# Patient Record
Sex: FEMALE | Race: BLACK OR AFRICAN AMERICAN | NOT HISPANIC OR LATINO | Employment: OTHER | URBAN - METROPOLITAN AREA
[De-identification: names, ages, dates, MRNs, and addresses within clinical notes are randomized per-mention and may not be internally consistent; named-entity substitution may affect disease eponyms.]

---

## 2023-02-01 PROBLEM — I10 HYPERTENSION: Status: ACTIVE | Noted: 2023-02-01

## 2023-02-01 PROBLEM — E11.9 DIABETES MELLITUS (MULTI): Status: ACTIVE | Noted: 2023-02-01

## 2023-02-01 PROBLEM — E78.5 DYSLIPIDEMIA: Status: ACTIVE | Noted: 2023-02-01

## 2023-02-01 PROBLEM — F17.200 CURRENT SMOKER: Status: ACTIVE | Noted: 2023-02-01

## 2023-02-01 PROBLEM — D64.9 ANEMIA: Status: ACTIVE | Noted: 2023-02-01

## 2023-02-01 PROBLEM — E87.6 HYPOKALEMIA: Status: ACTIVE | Noted: 2023-02-01

## 2023-02-01 PROBLEM — E66.811 CLASS 1 OBESITY WITH SERIOUS COMORBIDITY AND BODY MASS INDEX (BMI) OF 31.0 TO 31.9 IN ADULT: Status: ACTIVE | Noted: 2023-02-01

## 2023-02-01 PROBLEM — E66.9 CLASS 1 OBESITY WITH SERIOUS COMORBIDITY AND BODY MASS INDEX (BMI) OF 31.0 TO 31.9 IN ADULT: Status: ACTIVE | Noted: 2023-02-01

## 2023-02-01 PROBLEM — E83.52 HYPERCALCEMIA: Status: ACTIVE | Noted: 2023-02-01

## 2023-02-01 PROBLEM — N18.31 CKD STAGE G3A/A2, GFR 45-59 AND ALBUMIN CREATININE RATIO 30-299 MG/G (MULTI): Status: ACTIVE | Noted: 2023-02-01

## 2023-02-01 RX ORDER — DAPAGLIFLOZIN 10 MG/1
1 TABLET, FILM COATED ORAL DAILY
COMMUNITY
End: 2023-07-14 | Stop reason: SDUPTHER

## 2023-02-01 RX ORDER — INSULIN HUMAN 100 [IU]/ML
INJECTION, SOLUTION PARENTERAL
COMMUNITY
End: 2024-03-06 | Stop reason: SDUPTHER

## 2023-02-01 RX ORDER — BLOOD-GLUCOSE METER
KIT MISCELLANEOUS
COMMUNITY
Start: 2018-06-04 | End: 2023-07-17

## 2023-02-01 RX ORDER — PRAVASTATIN SODIUM 80 MG/1
1 TABLET ORAL DAILY
COMMUNITY
Start: 2013-06-12 | End: 2023-04-14

## 2023-02-01 RX ORDER — NAPROXEN SODIUM 220 MG
TABLET ORAL
COMMUNITY
Start: 2016-05-03

## 2023-02-01 RX ORDER — CHOLECALCIFEROL (VITAMIN D3) 25 MCG
1 TABLET,CHEWABLE ORAL
COMMUNITY

## 2023-02-01 RX ORDER — FUROSEMIDE 80 MG/1
TABLET ORAL
COMMUNITY
End: 2023-07-17 | Stop reason: ALTCHOICE

## 2023-02-01 RX ORDER — AMLODIPINE BESYLATE 10 MG/1
TABLET ORAL
COMMUNITY
Start: 2016-12-12

## 2023-02-01 RX ORDER — INSULIN HUMAN 100 [IU]/ML
INJECTION, SUSPENSION SUBCUTANEOUS
COMMUNITY
Start: 2013-09-21 | End: 2023-04-19

## 2023-02-01 RX ORDER — VIT C/E/ZN/COPPR/LUTEIN/ZEAXAN 250MG-90MG
1 CAPSULE ORAL DAILY
COMMUNITY

## 2023-02-01 RX ORDER — CARVEDILOL 12.5 MG/1
1 TABLET ORAL 2 TIMES DAILY
COMMUNITY
Start: 2018-06-29

## 2023-02-01 RX ORDER — DOXAZOSIN 8 MG/1
TABLET ORAL
COMMUNITY

## 2023-02-01 RX ORDER — ASPIRIN 81 MG/1
1 TABLET ORAL DAILY
COMMUNITY

## 2023-02-01 RX ORDER — VALSARTAN 320 MG/1
1 TABLET ORAL DAILY
COMMUNITY

## 2023-03-15 ENCOUNTER — OFFICE VISIT (OUTPATIENT)
Dept: PRIMARY CARE | Facility: CLINIC | Age: 78
End: 2023-03-15
Payer: MEDICARE

## 2023-03-15 VITALS
DIASTOLIC BLOOD PRESSURE: 56 MMHG | SYSTOLIC BLOOD PRESSURE: 111 MMHG | TEMPERATURE: 97.1 F | WEIGHT: 168 LBS | HEART RATE: 57 BPM | HEIGHT: 63 IN | BODY MASS INDEX: 29.77 KG/M2 | OXYGEN SATURATION: 96 %

## 2023-03-15 DIAGNOSIS — N18.31 STAGE 3A CHRONIC KIDNEY DISEASE (MULTI): ICD-10-CM

## 2023-03-15 DIAGNOSIS — E11.29 TYPE 2 DIABETES MELLITUS WITH MICROALBUMINURIA, WITH LONG-TERM CURRENT USE OF INSULIN (MULTI): ICD-10-CM

## 2023-03-15 DIAGNOSIS — R80.9 TYPE 2 DIABETES MELLITUS WITH MICROALBUMINURIA, WITH LONG-TERM CURRENT USE OF INSULIN (MULTI): ICD-10-CM

## 2023-03-15 DIAGNOSIS — I15.0 RENOVASCULAR HYPERTENSION: Primary | ICD-10-CM

## 2023-03-15 DIAGNOSIS — F17.200 CURRENT SMOKER: ICD-10-CM

## 2023-03-15 DIAGNOSIS — Z79.4 TYPE 2 DIABETES MELLITUS WITH MICROALBUMINURIA, WITH LONG-TERM CURRENT USE OF INSULIN (MULTI): ICD-10-CM

## 2023-03-15 PROCEDURE — 3074F SYST BP LT 130 MM HG: CPT | Performed by: INTERNAL MEDICINE

## 2023-03-15 PROCEDURE — 3078F DIAST BP <80 MM HG: CPT | Performed by: INTERNAL MEDICINE

## 2023-03-15 PROCEDURE — 4010F ACE/ARB THERAPY RXD/TAKEN: CPT | Performed by: INTERNAL MEDICINE

## 2023-03-15 PROCEDURE — 99406 BEHAV CHNG SMOKING 3-10 MIN: CPT | Performed by: INTERNAL MEDICINE

## 2023-03-15 PROCEDURE — 99214 OFFICE O/P EST MOD 30 MIN: CPT | Performed by: INTERNAL MEDICINE

## 2023-03-15 ASSESSMENT — ENCOUNTER SYMPTOMS
OCCASIONAL FEELINGS OF UNSTEADINESS: 0
LOSS OF SENSATION IN FEET: 0
DEPRESSION: 0

## 2023-03-15 ASSESSMENT — PATIENT HEALTH QUESTIONNAIRE - PHQ9
1. LITTLE INTEREST OR PLEASURE IN DOING THINGS: NOT AT ALL
SUM OF ALL RESPONSES TO PHQ9 QUESTIONS 1 AND 2: 0
2. FEELING DOWN, DEPRESSED OR HOPELESS: NOT AT ALL

## 2023-03-15 ASSESSMENT — COLUMBIA-SUICIDE SEVERITY RATING SCALE - C-SSRS
2. HAVE YOU ACTUALLY HAD ANY THOUGHTS OF KILLING YOURSELF?: NO
6. HAVE YOU EVER DONE ANYTHING, STARTED TO DO ANYTHING, OR PREPARED TO DO ANYTHING TO END YOUR LIFE?: NO

## 2023-03-15 NOTE — PROGRESS NOTES
Subjective   Maureen Ahuja is a 78 y.o. female who presents for interval follow-up.    HPI  Follows with her nephrologist, Dr. Jane for CKD (baseline Cr 1.2-1.4). Continues on valsartan 320 mg, amlodipine 10 mg, carvedilol 12.5 mg twice daily, and doxazosin at bedtime. Started on furosemide summer of 2021. 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 She checks her blood pressures once weekly and they are generally 130s-140s/60s-80s.  Started on Farxiga at some point after last visit 6/10/2022. Reports insurance not approving so currently relying on samples.     She reports compliance with her medications, no physical complaints. Brother had a stroke and she has a lot of responsibility as his caretaker.      Only on insulin since VERONIQUE and development of CKD until addition of Farxiga 10 mg. Used to follow with Dr. Hodge for diabetes before he retired. Measures her sugar 3 times per day.   Blood sugar log reviewed:  100-150 in the mornings, 130-140 around lunch/dinner, 100-170 at nighttime. Hi/Lo = 168/89 .   Previous visit we decreased evening insulin 12/22-10/18 hypoglycemia since.     Currently taking Humulin N and R; 36 in AM and 18 in PM units and 5 and 10 units respectively. Again stressed the importance of avoiding hypoglycemia, and it does not sound like she has had any hypoglycemic episodes recently. At the same time needs to be more careful with diet. She checks her blood sugars 3 times daily. She denies any vision changes, numbness tingling or pain in the feet, no chest pain shortness of breath or dizziness. Follows with ophtho annually.      She doesn't walk as much as she used to, mostly care for her brother who is wheelchair bound in the house alone. Now that her brother has an electric wheelchair she has been trying to walk daily. She has been eating better, more vegetables. She loves salads, cooks herself. Had quit smoking as of July 8, 2016 except for  "occasional brief lapses, but back to 10 cigs per week because of recent stress of her brother's stroke, since Dec 2020. She is trying to cut back. She knows that she wants to quit but is not ready at this time.      Nephrology-Dr. Jane  Endocrinology-Dr. Hodge (retired) - now will be this office  Ophthalmology-Dr. Tony, Dr. Goldberg     Objective   /56   Pulse 57   Temp 36.2 °C (97.1 °F)   Ht 1.6 m (5' 3\")   Wt 76.2 kg (168 lb)   SpO2 96%   BMI 29.76 kg/m²    Physical Exam  Gen: NAD, pleasant, A&Ox3  HEENT: PERRL, EOMI, MMM, OP clear  Neck: supple, no JVD, normal carotid upstroke  Pulm: lungs rhonchorous, good air movement, no wheezes or crackles  CV: RRR, no m/r/g, 2+ DP pulses  Abd: NABS, soft, NT, ND no HSM  Ext: trace peripheral edema to ankles, feet and lower extremities without wounds  Neuro: CN II-XII intact, no focal motor deficits; decreased sensation to vibration in b/l feet      Assessment/Plan   HTN/CKD: nephrologist, Dr. 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 Hodge (endo) retired - now will be managed here per her preference  -regular podiatry, Dr. Inman  -Annual ophthalmology appointments  -Continue aspirin for primary prevention, pt prefers to continue after discussion of risk/benefit  -Continue pravastatin for primary prevention and dyslipidemia  -Continue diet " regulation  -Off metformin since VERONIQUE, started SGLT2-i in JUN 2022, currently relying on samples     Anemia: Stable borderline macrocytic, likely secondary to B12 deficiency  -Continue to B12 supplementation     Health maintenance  -Colonoscopy March 2010 normal, next due in 2020 for screening purposes ([facility]) -Cologuard + 12/26/2022, referred for colonoscopy  -Mammogram 6/27/2022, continue biannual  -Recommend annual influenza, Shingrix recommended, had covid vaccine J&J in 3/2021  -Continue diet and exercise lifestyle changes  -Patient has recent relapse, smoking 10cigs/day- contemplative stage  -Last DEXA in 12/22/2017 with normal bone density repeat in 5 years, ordered     Followup in 3-4 months or as needed  Problem List Items Addressed This Visit    None           Tae Mancia MD

## 2023-03-30 PROBLEM — E66.811 CLASS 1 OBESITY WITH SERIOUS COMORBIDITY AND BODY MASS INDEX (BMI) OF 31.0 TO 31.9 IN ADULT: Status: RESOLVED | Noted: 2023-02-01 | Resolved: 2023-03-30

## 2023-03-30 PROBLEM — E66.9 CLASS 1 OBESITY WITH SERIOUS COMORBIDITY AND BODY MASS INDEX (BMI) OF 31.0 TO 31.9 IN ADULT: Status: RESOLVED | Noted: 2023-02-01 | Resolved: 2023-03-30

## 2023-04-12 DIAGNOSIS — E78.5 HYPERLIPIDEMIA, UNSPECIFIED: ICD-10-CM

## 2023-04-14 RX ORDER — PRAVASTATIN SODIUM 80 MG/1
TABLET ORAL
Qty: 90 TABLET | Refills: 3 | Status: SHIPPED | OUTPATIENT
Start: 2023-04-14 | End: 2024-04-04

## 2023-04-18 DIAGNOSIS — E11.9 TYPE 2 DIABETES MELLITUS WITHOUT COMPLICATIONS (MULTI): ICD-10-CM

## 2023-04-19 ENCOUNTER — HOSPITAL ENCOUNTER (OUTPATIENT)
Dept: DATA CONVERSION | Facility: HOSPITAL | Age: 78
End: 2023-04-19
Attending: STUDENT IN AN ORGANIZED HEALTH CARE EDUCATION/TRAINING PROGRAM | Admitting: STUDENT IN AN ORGANIZED HEALTH CARE EDUCATION/TRAINING PROGRAM
Payer: MEDICARE

## 2023-04-19 DIAGNOSIS — Z12.11 ENCOUNTER FOR SCREENING FOR MALIGNANT NEOPLASM OF COLON: ICD-10-CM

## 2023-04-19 DIAGNOSIS — K64.8 OTHER HEMORRHOIDS: ICD-10-CM

## 2023-04-19 DIAGNOSIS — Z12.12 ENCOUNTER FOR SCREENING FOR MALIGNANT NEOPLASM OF RECTUM: ICD-10-CM

## 2023-04-19 DIAGNOSIS — Z98.0 INTESTINAL BYPASS AND ANASTOMOSIS STATUS: ICD-10-CM

## 2023-04-19 RX ORDER — INSULIN HUMAN 100 [IU]/ML
INJECTION, SUSPENSION SUBCUTANEOUS
Qty: 60 ML | Refills: 1 | Status: SHIPPED | OUTPATIENT
Start: 2023-04-19 | End: 2024-03-06 | Stop reason: SDUPTHER

## 2023-07-14 DIAGNOSIS — E11.29 TYPE 2 DIABETES MELLITUS WITH MICROALBUMINURIA, WITH LONG-TERM CURRENT USE OF INSULIN (MULTI): Primary | ICD-10-CM

## 2023-07-14 DIAGNOSIS — R80.9 TYPE 2 DIABETES MELLITUS WITH MICROALBUMINURIA, WITH LONG-TERM CURRENT USE OF INSULIN (MULTI): Primary | ICD-10-CM

## 2023-07-14 DIAGNOSIS — Z79.4 TYPE 2 DIABETES MELLITUS WITH MICROALBUMINURIA, WITH LONG-TERM CURRENT USE OF INSULIN (MULTI): Primary | ICD-10-CM

## 2023-07-14 RX ORDER — DAPAGLIFLOZIN 10 MG/1
10 TABLET, FILM COATED ORAL DAILY
Qty: 90 TABLET | Refills: 3 | Status: SHIPPED | OUTPATIENT
Start: 2023-07-14 | End: 2023-07-14

## 2023-07-15 DIAGNOSIS — E11.9 TYPE 2 DIABETES MELLITUS WITHOUT COMPLICATIONS (MULTI): ICD-10-CM

## 2023-07-17 ENCOUNTER — OFFICE VISIT (OUTPATIENT)
Dept: PRIMARY CARE | Facility: CLINIC | Age: 78
End: 2023-07-17
Payer: MEDICARE

## 2023-07-17 VITALS
TEMPERATURE: 97.5 F | HEART RATE: 56 BPM | BODY MASS INDEX: 31.53 KG/M2 | SYSTOLIC BLOOD PRESSURE: 146 MMHG | DIASTOLIC BLOOD PRESSURE: 73 MMHG | WEIGHT: 178 LBS | OXYGEN SATURATION: 93 %

## 2023-07-17 DIAGNOSIS — N18.32 STAGE 3B CHRONIC KIDNEY DISEASE (MULTI): ICD-10-CM

## 2023-07-17 DIAGNOSIS — Z79.4 TYPE 2 DIABETES MELLITUS WITH MICROALBUMINURIA, WITH LONG-TERM CURRENT USE OF INSULIN (MULTI): ICD-10-CM

## 2023-07-17 DIAGNOSIS — R80.9 TYPE 2 DIABETES MELLITUS WITH MICROALBUMINURIA, WITH LONG-TERM CURRENT USE OF INSULIN (MULTI): ICD-10-CM

## 2023-07-17 DIAGNOSIS — I15.0 RENOVASCULAR HYPERTENSION: Primary | ICD-10-CM

## 2023-07-17 DIAGNOSIS — E11.29 TYPE 2 DIABETES MELLITUS WITH MICROALBUMINURIA, WITH LONG-TERM CURRENT USE OF INSULIN (MULTI): ICD-10-CM

## 2023-07-17 LAB — HBA1C MFR BLD: 7.2 % (ref 4.2–6.5)

## 2023-07-17 PROCEDURE — 1036F TOBACCO NON-USER: CPT | Performed by: INTERNAL MEDICINE

## 2023-07-17 PROCEDURE — 99214 OFFICE O/P EST MOD 30 MIN: CPT | Performed by: INTERNAL MEDICINE

## 2023-07-17 PROCEDURE — 1159F MED LIST DOCD IN RCRD: CPT | Performed by: INTERNAL MEDICINE

## 2023-07-17 PROCEDURE — 83036 HEMOGLOBIN GLYCOSYLATED A1C: CPT | Performed by: INTERNAL MEDICINE

## 2023-07-17 PROCEDURE — 1160F RVW MEDS BY RX/DR IN RCRD: CPT | Performed by: INTERNAL MEDICINE

## 2023-07-17 PROCEDURE — 3078F DIAST BP <80 MM HG: CPT | Performed by: INTERNAL MEDICINE

## 2023-07-17 PROCEDURE — 3077F SYST BP >= 140 MM HG: CPT | Performed by: INTERNAL MEDICINE

## 2023-07-17 RX ORDER — FUROSEMIDE 20 MG/1
20 TABLET ORAL 2 TIMES DAILY
Qty: 60 TABLET | Refills: 5 | COMMUNITY
Start: 2023-06-26 | End: 2023-12-23

## 2023-07-17 RX ORDER — BLOOD-GLUCOSE METER
KIT MISCELLANEOUS
Qty: 300 STRIP | Refills: 3 | Status: SHIPPED | OUTPATIENT
Start: 2023-07-17

## 2023-07-17 NOTE — PROGRESS NOTES
Subjective   Maureen Ahuja is a 78 y.o. female who presents for interval follow-up.    HPI  Follows with her nephrologist, Dr. Jane for CKD (baseline Cr 1.2-1.4). Continues on valsartan 320 mg, amlodipine 10 mg, carvedilol 12.5 mg twice daily, and doxazosin at bedtime. Started on furosemide summer of 2021. 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 She checks her blood pressures once weekly and they are generally 130s-140s/60s-80s.       She reports compliance with her medications, no physical complaints. Brother had a stroke and she has a lot of responsibility as his caretaker.      Only on insulin since VERONIQUE and development of CKD until addition of Farxiga 10 mg. Used to follow with Dr. Hodge for diabetes before he retired. Measures her sugar 3 times per day.   Blood sugar log reviewed:  100-150 in the mornings, 130-140 around lunch/dinner, 100-170 at nighttime. Hi/Lo = 168/89 .      Currently taking Humulin N and R; 36 in AM and 18 in PM units and 4 and 12 units respectively. Again stressed the importance of avoiding hypoglycemia, and it does not sound like she has had any hypoglycemic episodes recently. At the same time needs to be more careful with diet. She checks her blood sugars 3 times daily. She denies any vision changes, numbness tingling or pain in the feet, no chest pain shortness of breath or dizziness. Follows with ophtho annually.      She doesn't walk as much as she used to, mostly care for her brother who is wheelchair bound in the house alone. Now that her brother has an electric wheelchair she has been trying to walk daily. She has been eating better, more vegetables. She loves salads, cooks herself. Had quit smoking as of July 8, 2016 except for occasional brief lapses, but back to 10 cigs per week because of recent stress of her brother's stroke, since Dec 2020. She is trying to cut back. She knows that she wants to quit but is not ready at this  time.      Nephrology-Dr. Jane  Endocrinology-Dr. Hodge (retired) - now will be this office  Ophthalmology-Dr. Tony, Dr. Goldberg     Objective   /73   Pulse 56   Temp 36.4 °C (97.5 °F)   Wt 80.7 kg (178 lb)   SpO2 93%   BMI 31.53 kg/m²    Physical Exam  Gen: NAD, pleasant, A&Ox3  HEENT: PERRL, EOMI, MMM, OP clear  Neck: supple, no JVD, normal carotid upstroke  Pulm: lungs rhonchorous, good air movement, no wheezes or crackles  CV: RRR, no m/r/g, 2+ DP pulses  Abd: NABS, soft, NT, ND no HSM  Ext: trace peripheral edema to ankles, feet and lower extremities without wounds  Neuro: CN II-XII intact, no focal motor deficits; decreased sensation to vibration in b/l feet      Assessment/Plan   HTN/CKD G3b w/proteinuria: nephrologist, Dr. 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 Hodge (endo) retired - now will be managed here per her preference  -regular podiatry, Dr. Inman  -Annual ophthalmology appointments  -Continue aspirin for primary prevention, pt prefers to continue after discussion of risk/benefit  -Continue pravastatin for primary prevention and dyslipidemia  -Continue diet regulation  -Off metformin since VERONIQUE, started SGLT2-i in JUN 2022, currently relying on samples     Anemia: Stable borderline macrocytic, likely secondary to B12 deficiency  -Continue to B12 supplementation     Health maintenance  -Colonoscopy March 2010 normal, next due in 2020 for  screening purposes (J[facility]) -Cologuard + 12/26/2022, referred for colonoscopy  -Mammogram 6/27/2022, continue biannual  -Recommend annual influenza, Shingrix recommended, had covid vaccine J&J in 3/2021  -Continue diet and exercise lifestyle changes  -Patient has recent relapse, smoking 10cigs/day- contemplative stage  -Last DEXA in 12/22/2017 with normal bone density repeat in 5 years, ordered     Followup in 3-4 months or as needed  Problem List Items Addressed This Visit       Diabetes mellitus (CMS/HCC)    Relevant Orders    POCT Glycosylated Hemoglobin (HGB A1C) docked device              Tae Mancia MD

## 2023-08-17 PROBLEM — E11.29 DIABETES MELLITUS WITH MICROALBUMINURIA (MULTI): Status: ACTIVE | Noted: 2023-08-17

## 2023-08-17 PROBLEM — Z79.4 CONTROLLED TYPE 2 DIABETES MELLITUS WITH STAGE 3 CHRONIC KIDNEY DISEASE, WITH LONG-TERM CURRENT USE OF INSULIN (MULTI): Status: ACTIVE | Noted: 2023-08-17

## 2023-08-17 PROBLEM — E11.22 CONTROLLED TYPE 2 DIABETES MELLITUS WITH STAGE 3 CHRONIC KIDNEY DISEASE, WITH LONG-TERM CURRENT USE OF INSULIN (MULTI): Status: ACTIVE | Noted: 2023-08-17

## 2023-08-17 PROBLEM — R80.9 DIABETES MELLITUS WITH MICROALBUMINURIA (MULTI): Status: ACTIVE | Noted: 2023-08-17

## 2023-08-17 PROBLEM — N18.30 CONTROLLED TYPE 2 DIABETES MELLITUS WITH STAGE 3 CHRONIC KIDNEY DISEASE, WITH LONG-TERM CURRENT USE OF INSULIN (MULTI): Status: ACTIVE | Noted: 2023-08-17

## 2023-08-17 RX ORDER — DOXAZOSIN 2 MG/1
2 TABLET ORAL DAILY
COMMUNITY
Start: 2023-08-04

## 2023-10-02 ENCOUNTER — PHARMACY VISIT (OUTPATIENT)
Dept: PHARMACY | Facility: CLINIC | Age: 78
End: 2023-10-02
Payer: MEDICARE

## 2023-10-02 PROCEDURE — RXMED WILLOW AMBULATORY MEDICATION CHARGE

## 2023-10-17 ENCOUNTER — OFFICE VISIT (OUTPATIENT)
Dept: PRIMARY CARE | Facility: CLINIC | Age: 78
End: 2023-10-17
Payer: MEDICARE

## 2023-10-17 VITALS
SYSTOLIC BLOOD PRESSURE: 137 MMHG | BODY MASS INDEX: 31.18 KG/M2 | TEMPERATURE: 97.4 F | DIASTOLIC BLOOD PRESSURE: 60 MMHG | HEART RATE: 55 BPM | WEIGHT: 176 LBS | OXYGEN SATURATION: 95 %

## 2023-10-17 DIAGNOSIS — R80.9 TYPE 2 DIABETES MELLITUS WITH MICROALBUMINURIA, WITH LONG-TERM CURRENT USE OF INSULIN (MULTI): ICD-10-CM

## 2023-10-17 DIAGNOSIS — E11.29 TYPE 2 DIABETES MELLITUS WITH MICROALBUMINURIA, WITH LONG-TERM CURRENT USE OF INSULIN (MULTI): ICD-10-CM

## 2023-10-17 DIAGNOSIS — I15.0 RENOVASCULAR HYPERTENSION: ICD-10-CM

## 2023-10-17 DIAGNOSIS — N18.32 CKD STAGE G3B/A2, GFR 30-44 AND ALBUMIN CREATININE RATIO 30-299 MG/G (MULTI): Primary | ICD-10-CM

## 2023-10-17 DIAGNOSIS — Z79.4 TYPE 2 DIABETES MELLITUS WITH MICROALBUMINURIA, WITH LONG-TERM CURRENT USE OF INSULIN (MULTI): ICD-10-CM

## 2023-10-17 LAB — HBA1C MFR BLD: 7.2 % (ref 4.2–6.5)

## 2023-10-17 PROCEDURE — 99214 OFFICE O/P EST MOD 30 MIN: CPT | Performed by: INTERNAL MEDICINE

## 2023-10-17 PROCEDURE — 90662 IIV NO PRSV INCREASED AG IM: CPT | Performed by: INTERNAL MEDICINE

## 2023-10-17 PROCEDURE — G0008 ADMIN INFLUENZA VIRUS VAC: HCPCS | Performed by: INTERNAL MEDICINE

## 2023-10-17 PROCEDURE — 1160F RVW MEDS BY RX/DR IN RCRD: CPT | Performed by: INTERNAL MEDICINE

## 2023-10-17 PROCEDURE — 1036F TOBACCO NON-USER: CPT | Performed by: INTERNAL MEDICINE

## 2023-10-17 PROCEDURE — 3078F DIAST BP <80 MM HG: CPT | Performed by: INTERNAL MEDICINE

## 2023-10-17 PROCEDURE — 1159F MED LIST DOCD IN RCRD: CPT | Performed by: INTERNAL MEDICINE

## 2023-10-17 PROCEDURE — 83036 HEMOGLOBIN GLYCOSYLATED A1C: CPT | Mod: CLIA WAIVED TEST | Performed by: INTERNAL MEDICINE

## 2023-10-17 PROCEDURE — 3075F SYST BP GE 130 - 139MM HG: CPT | Performed by: INTERNAL MEDICINE

## 2023-10-17 NOTE — PROGRESS NOTES
Subjective   Maureen Ahuja is a 78 y.o. female who presents for interval follow-up.    HPI  Follows with her nephrologist, Dr. Jane for CKD (baseline Cr 1.2-1.4). Continues on valsartan 320 mg, amlodipine 10 mg, carvedilol 12.5 mg twice daily, and doxazosin at bedtime. Started on furosemide summer of 2021. 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 She checks her blood pressures once weekly and they are generally 130s-140s/60s-80s.       She reports compliance with her medications, no physical complaints. Brother had a stroke and she has a lot of responsibility as his caretaker.      Only on insulin since VERONIQUE and development of CKD until addition of Farxiga 10 mg. Used to follow with Dr. Hodge for diabetes before he retired. Measures her sugar 3 times per day.   Blood sugar log reviewed:  100-150 in the mornings, 130-140 around lunch/dinner, 100-170 at nighttime. Hi/Lo = 168/89 .      Currently taking Humulin N and R; 36 in AM and 18 in PM units and 4 and 12 units respectively. Again stressed the importance of avoiding hypoglycemia, and it does not sound like she has had any hypoglycemic episodes recently. Lowest was 93 on once occasion.   At the same time needs to be more careful with diet. She checks her blood sugars 3 times daily. She denies any vision changes, numbness tingling or pain in the feet, no chest pain shortness of breath or dizziness. Follows with ophtho annually.      She doesn't walk as much as she used to, mostly care for her brother who is wheelchair bound in the house alone. Now that her brother has an electric wheelchair she has been trying to walk daily. She has been eating better, more vegetables. She loves salads, cooks herself. Had quit smoking as of July 8, 2016 except for occasional brief lapses, but back to 10 cigs per week because of recent stress of her brother's stroke, since Dec 2020. She is trying to cut back. She  knows that she wants to quit but is not ready at this time.      Nephrology-Dr. Jane  Endocrinology-Dr. Hodge (retired) - now will be this office  Ophthalmology-Dr. Tony, Dr. Goldberg     Objective   /60   Pulse 55   Temp 36.3 °C (97.4 °F)   Wt 79.8 kg (176 lb)   SpO2 95%   BMI 31.18 kg/m²    Physical Exam  Gen: NAD, pleasant, A&Ox3  HEENT: PERRL, EOMI, MMM, OP clear  Neck: supple, no JVD, normal carotid upstroke  Pulm: lungs rhonchorous, good air movement, no wheezes or crackles  CV: RRR, no m/r/g, 2+ DP pulses  Abd: NABS, soft, NT, ND no HSM  Ext: trace peripheral edema to ankles, feet and lower extremities without wounds  Neuro: CN II-XII intact, no focal motor deficits; decreased sensation to vibration in b/l feet      Assessment/Plan   HTN/CKD G3b w/proteinuria: nephrologist, Dr. 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 Hodge (endo) retired - now will be managed here per her preference  -regular podiatry, Dr. Inman  -Annual ophthalmology appointments  -Continue aspirin for primary prevention, pt prefers to continue after discussion of risk/benefit  -Continue pravastatin for primary prevention and dyslipidemia  -Continue diet regulation  -Off metformin since VERONIQUE, started SGLT2-i in JUN 2022, currently relying on samples     Anemia: Stable borderline  macrocytic, likely secondary to B12 deficiency  -Continue to B12 supplementation     Health maintenance  -Colonoscopy March 2010 normal, next due in 2020 for screening purposes ([facility]) -Cologuard + 12/26/2022, referred for colonoscopy, had flex sig 4/19/2023  -Mammogram 6/27/2022, continue biannual  -Recommend annual influenza, Shingrix recommended, had covid vaccine J&J in 3/2021  -Continue diet and exercise lifestyle changes  -Patient has recent relapse, smoking 10cigs/day- contemplative stage  -Last DEXA in 12/22/2017 with normal bone density repeat in 5 years, ordered     Followup in 3-4 months or as needed  Problem List Items Addressed This Visit       Diabetes mellitus (CMS/HCC)    Relevant Orders    POCT Glycosylated Hemoglobin (HGB A1C) docked device              Tae Mancia MD

## 2023-10-20 ENCOUNTER — APPOINTMENT (OUTPATIENT)
Dept: RADIOLOGY | Facility: CLINIC | Age: 78
End: 2023-10-20
Payer: MEDICARE

## 2023-12-26 PROCEDURE — RXMED WILLOW AMBULATORY MEDICATION CHARGE

## 2023-12-28 ENCOUNTER — PHARMACY VISIT (OUTPATIENT)
Dept: PHARMACY | Facility: CLINIC | Age: 78
End: 2023-12-28
Payer: MEDICARE

## 2024-03-06 ENCOUNTER — OFFICE VISIT (OUTPATIENT)
Dept: PRIMARY CARE | Facility: CLINIC | Age: 79
End: 2024-03-06
Payer: MEDICARE

## 2024-03-06 VITALS
HEART RATE: 76 BPM | HEIGHT: 63 IN | OXYGEN SATURATION: 95 % | SYSTOLIC BLOOD PRESSURE: 155 MMHG | WEIGHT: 170 LBS | BODY MASS INDEX: 30.12 KG/M2 | TEMPERATURE: 98.3 F | DIASTOLIC BLOOD PRESSURE: 58 MMHG

## 2024-03-06 DIAGNOSIS — N18.32 CKD STAGE G3B/A2, GFR 30-44 AND ALBUMIN CREATININE RATIO 30-299 MG/G (MULTI): ICD-10-CM

## 2024-03-06 DIAGNOSIS — J43.2 CENTRILOBULAR EMPHYSEMA (MULTI): ICD-10-CM

## 2024-03-06 DIAGNOSIS — Z87.891 PERSONAL HISTORY OF NICOTINE DEPENDENCE: ICD-10-CM

## 2024-03-06 DIAGNOSIS — F17.200 ENCOUNTER FOR SCREENING FOR MALIGNANT NEOPLASM OF LUNG IN CURRENT SMOKER WITH 30 PACK YEAR HISTORY OR GREATER: ICD-10-CM

## 2024-03-06 DIAGNOSIS — N18.30 CONTROLLED TYPE 2 DIABETES MELLITUS WITH STAGE 3 CHRONIC KIDNEY DISEASE, WITH LONG-TERM CURRENT USE OF INSULIN (MULTI): ICD-10-CM

## 2024-03-06 DIAGNOSIS — Z12.31 ENCOUNTER FOR SCREENING MAMMOGRAM FOR BREAST CANCER: ICD-10-CM

## 2024-03-06 DIAGNOSIS — E11.9 TYPE 2 DIABETES MELLITUS WITHOUT COMPLICATIONS (MULTI): ICD-10-CM

## 2024-03-06 DIAGNOSIS — R80.9 TYPE 2 DIABETES MELLITUS WITH MICROALBUMINURIA, WITH LONG-TERM CURRENT USE OF INSULIN (MULTI): ICD-10-CM

## 2024-03-06 DIAGNOSIS — R91.8 PULMONARY NODULES: ICD-10-CM

## 2024-03-06 DIAGNOSIS — Z12.2 ENCOUNTER FOR SCREENING FOR MALIGNANT NEOPLASM OF LUNG IN CURRENT SMOKER WITH 30 PACK YEAR HISTORY OR GREATER: ICD-10-CM

## 2024-03-06 DIAGNOSIS — F17.200 CURRENT SMOKER: ICD-10-CM

## 2024-03-06 DIAGNOSIS — I15.0 RENOVASCULAR HYPERTENSION: ICD-10-CM

## 2024-03-06 DIAGNOSIS — E11.22 CONTROLLED TYPE 2 DIABETES MELLITUS WITH STAGE 3 CHRONIC KIDNEY DISEASE, WITH LONG-TERM CURRENT USE OF INSULIN (MULTI): ICD-10-CM

## 2024-03-06 DIAGNOSIS — Z79.4 TYPE 2 DIABETES MELLITUS WITH MICROALBUMINURIA, WITH LONG-TERM CURRENT USE OF INSULIN (MULTI): ICD-10-CM

## 2024-03-06 DIAGNOSIS — E11.29 TYPE 2 DIABETES MELLITUS WITH MICROALBUMINURIA, WITH LONG-TERM CURRENT USE OF INSULIN (MULTI): ICD-10-CM

## 2024-03-06 DIAGNOSIS — Z79.4 CONTROLLED TYPE 2 DIABETES MELLITUS WITH STAGE 3 CHRONIC KIDNEY DISEASE, WITH LONG-TERM CURRENT USE OF INSULIN (MULTI): ICD-10-CM

## 2024-03-06 DIAGNOSIS — Z00.00 ROUTINE GENERAL MEDICAL EXAMINATION AT HEALTH CARE FACILITY: Primary | ICD-10-CM

## 2024-03-06 LAB
ALBUMIN SERPL BCP-MCNC: 3.7 G/DL (ref 3.4–5)
ALP SERPL-CCNC: 75 U/L (ref 33–136)
ALT SERPL W P-5'-P-CCNC: 11 U/L (ref 7–45)
ANION GAP SERPL CALC-SCNC: 15 MMOL/L (ref 10–20)
AST SERPL W P-5'-P-CCNC: 14 U/L (ref 9–39)
BILIRUB SERPL-MCNC: 0.4 MG/DL (ref 0–1.2)
BUN SERPL-MCNC: 17 MG/DL (ref 6–23)
CALCIUM SERPL-MCNC: 9.8 MG/DL (ref 8.6–10.6)
CHLORIDE SERPL-SCNC: 102 MMOL/L (ref 98–107)
CHOLEST SERPL-MCNC: 143 MG/DL (ref 0–199)
CHOLESTEROL/HDL RATIO: 3.9
CO2 SERPL-SCNC: 30 MMOL/L (ref 21–32)
CREAT SERPL-MCNC: 1.39 MG/DL (ref 0.5–1.05)
EGFRCR SERPLBLD CKD-EPI 2021: 39 ML/MIN/1.73M*2
ERYTHROCYTE [DISTWIDTH] IN BLOOD BY AUTOMATED COUNT: 16.3 % (ref 11.5–14.5)
EST. AVERAGE GLUCOSE BLD GHB EST-MCNC: 163 MG/DL
GLUCOSE SERPL-MCNC: 101 MG/DL (ref 74–99)
HBA1C MFR BLD: 7.3 %
HCT VFR BLD AUTO: 43.2 % (ref 36–46)
HDLC SERPL-MCNC: 36.6 MG/DL
HGB BLD-MCNC: 12.5 G/DL (ref 12–16)
IRON SATN MFR SERPL: 13 % (ref 25–45)
IRON SERPL-MCNC: 40 UG/DL (ref 35–150)
LDLC SERPL CALC-MCNC: 83 MG/DL
MCH RBC QN AUTO: 26.7 PG (ref 26–34)
MCHC RBC AUTO-ENTMCNC: 28.9 G/DL (ref 32–36)
MCV RBC AUTO: 92 FL (ref 80–100)
NON HDL CHOLESTEROL: 106 MG/DL (ref 0–149)
NRBC BLD-RTO: 0 /100 WBCS (ref 0–0)
PLATELET # BLD AUTO: 337 X10*3/UL (ref 150–450)
POC ALBUMIN /CREATININE RATIO MANUALLY ENTERED: >300 UG/MG CREAT
POC URINE ALBUMIN: 150 MG/L
POC URINE CREATININE: 200 MG/DL
POTASSIUM SERPL-SCNC: 4 MMOL/L (ref 3.5–5.3)
PROT SERPL-MCNC: 6.7 G/DL (ref 6.4–8.2)
RBC # BLD AUTO: 4.68 X10*6/UL (ref 4–5.2)
SODIUM SERPL-SCNC: 143 MMOL/L (ref 136–145)
TIBC SERPL-MCNC: 311 UG/DL (ref 240–445)
TRIGL SERPL-MCNC: 116 MG/DL (ref 0–149)
UIBC SERPL-MCNC: 271 UG/DL (ref 110–370)
VIT B12 SERPL-MCNC: 273 PG/ML (ref 211–911)
VLDL: 23 MG/DL (ref 0–40)
WBC # BLD AUTO: 8.3 X10*3/UL (ref 4.4–11.3)

## 2024-03-06 PROCEDURE — 99214 OFFICE O/P EST MOD 30 MIN: CPT | Performed by: INTERNAL MEDICINE

## 2024-03-06 PROCEDURE — 36415 COLL VENOUS BLD VENIPUNCTURE: CPT

## 2024-03-06 PROCEDURE — 3077F SYST BP >= 140 MM HG: CPT | Performed by: INTERNAL MEDICINE

## 2024-03-06 PROCEDURE — 1125F AMNT PAIN NOTED PAIN PRSNT: CPT | Performed by: INTERNAL MEDICINE

## 2024-03-06 PROCEDURE — 85027 COMPLETE CBC AUTOMATED: CPT

## 2024-03-06 PROCEDURE — 3078F DIAST BP <80 MM HG: CPT | Performed by: INTERNAL MEDICINE

## 2024-03-06 PROCEDURE — 83550 IRON BINDING TEST: CPT

## 2024-03-06 PROCEDURE — 1159F MED LIST DOCD IN RCRD: CPT | Performed by: INTERNAL MEDICINE

## 2024-03-06 PROCEDURE — 1123F ACP DISCUSS/DSCN MKR DOCD: CPT | Performed by: INTERNAL MEDICINE

## 2024-03-06 PROCEDURE — G0439 PPPS, SUBSEQ VISIT: HCPCS | Performed by: INTERNAL MEDICINE

## 2024-03-06 PROCEDURE — 82607 VITAMIN B-12: CPT

## 2024-03-06 PROCEDURE — 4004F PT TOBACCO SCREEN RCVD TLK: CPT | Performed by: INTERNAL MEDICINE

## 2024-03-06 PROCEDURE — 83540 ASSAY OF IRON: CPT

## 2024-03-06 PROCEDURE — 80053 COMPREHEN METABOLIC PANEL: CPT

## 2024-03-06 PROCEDURE — 1170F FXNL STATUS ASSESSED: CPT | Performed by: INTERNAL MEDICINE

## 2024-03-06 PROCEDURE — 1160F RVW MEDS BY RX/DR IN RCRD: CPT | Performed by: INTERNAL MEDICINE

## 2024-03-06 PROCEDURE — 80061 LIPID PANEL: CPT

## 2024-03-06 PROCEDURE — 83036 HEMOGLOBIN GLYCOSYLATED A1C: CPT

## 2024-03-06 PROCEDURE — 82044 UR ALBUMIN SEMIQUANTITATIVE: CPT | Performed by: INTERNAL MEDICINE

## 2024-03-06 RX ORDER — INSULIN HUMAN 100 [IU]/ML
INJECTION, SUSPENSION SUBCUTANEOUS
Qty: 200 ML | Refills: 1 | Status: SHIPPED | OUTPATIENT
Start: 2024-03-06

## 2024-03-06 RX ORDER — INSULIN HUMAN 100 [IU]/ML
INJECTION, SOLUTION PARENTERAL
Qty: 10 ML | Refills: 3 | Status: SHIPPED | OUTPATIENT
Start: 2024-03-06

## 2024-03-06 ASSESSMENT — ACTIVITIES OF DAILY LIVING (ADL)
DOING_HOUSEWORK: INDEPENDENT
MANAGING_FINANCES: INDEPENDENT
TAKING_MEDICATION: INDEPENDENT
DRESSING: INDEPENDENT
GROCERY_SHOPPING: INDEPENDENT
BATHING: INDEPENDENT

## 2024-03-06 ASSESSMENT — ENCOUNTER SYMPTOMS: OCCASIONAL FEELINGS OF UNSTEADINESS: 0

## 2024-03-06 ASSESSMENT — PAIN SCALES - GENERAL: PAINLEVEL: 2

## 2024-03-06 NOTE — PROGRESS NOTES
Subjective   Maureen Ahuja is a 78 y.o. female who presents for interval follow-up and AWV.    HPI  Follows with her nephrologist, Dr. Jane for CKD (baseline Cr 1.2-1.4). Continues on valsartan 320 mg, amlodipine 10 mg, carvedilol 12.5 mg twice daily, and doxazosin at bedtime. Started on furosemide summer of 2021. 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 She checks her blood pressures once weekly and they are generally 130s-140s/60s-80s.       She reports compliance with her medications, no physical complaints. Brother had a stroke and lives with her, caretakers come M-F.  He wheelchair bound and almost completely dependent.     Only on insulin since development of CKD until addition of Farxiga 10 mg. Used to follow with Dr. Hodge for diabetes before he retired. Measures her sugar 3 times per day.   Blood sugar log reviewed:  100-150 in the mornings, 110-140 around lunch/dinner, 170-220 at nighttime. Hi/Lo = 238/102 .      Currently taking Humulin N 36 units in AM and 18 units in PM and Humulin R 4 units and 12 units.   She purposely eats more at night to avoid nighttime lows.   She denies any vision changes, numbness or pain in the feet, no chest pain shortness of breath or dizziness.     She doesn't walk as much as she used to, mostly care for her brother who is wheelchair bound in the house alone. Now that her brother has an electric wheelchair she has been trying to walk daily. She has been eating better, more vegetables. She loves salads, cooks herself. Had quit smoking as of July 8, 2016 (~1/2ppd for 40 years at that time) except for occasional brief lapses, but back to 10 cigs per week because of recent stress of her brother's stroke, since Dec 2020. She is trying to cut back. She knows that she wants to quit but is not ready at this time.      Nephrology-Dr. Jane  Endocrinology-Dr. Hodge (retired) - now will be this  "office  Ophthalmology-Dr. Tony, Dr. Goldberg     Objective   /58   Pulse 76   Temp 36.8 °C (98.3 °F)   Ht 1.6 m (5' 3\")   Wt 77.1 kg (170 lb)   SpO2 95%   BMI 30.11 kg/m²    Physical Exam  Gen: NAD, pleasant, A&Ox3  HEENT: PERRL, EOMI, MMM, OP clear  Neck: supple, no JVD, normal carotid upstroke  Pulm: lungs rhonchorous, good air movement, no wheezes or crackles  CV: RRR, no m/r/g, 2+ DP pulses  Abd: NABS, soft, NT, ND no HSM  Ext: trace - 1+ peripheral edema to ankles, feet and lower extremities without wounds  Neuro: CN II-XII intact, no focal motor deficits; decreased sensation to vibration in b/l feet      Assessment/Plan   HTN/CKD G3b w/proteinuria: nephrologist, Dr. 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 Hodge (endo) retired - managed here per her preference  -regular podiatry, Dr. Inman  -Annual ophthalmology appointments, Dr. Goldberg  -Continue aspirin for primary prevention, pt prefers to continue after discussion of risk/benefit  -Continue pravastatin for primary prevention and dyslipidemia  -Continue diet regulation  -Off metformin since VERONIQUE, started SGLT2-i in JUN 2022     Anemia: Stable borderline macrocytic, likely secondary to B12 deficiency  -Continue to B12 supplementation     Emphysema: mild, CT 3/3/2023, asymptomatic; this was done [facility], she did not get repeat.    Health " maintenance  -Colonoscopy - flex sig 4/19/2023 told to repeat in 10 years  -Mammogram 6/27/2022, continue biannual  -Recommend annual influenza, Shingrix recommended, had covid vaccine J&J in 3/2021, recommend RSV, COVID booster  -Continue diet and exercise lifestyle changes  -Patient has recent relapse, smoking 10cigs/day- contemplative stage  -Last DEXA in 12/22/2017 with normal bone density repeat in 5 years, ordered, not completed     Followup in 3-4 months or as needed  Problem List Items Addressed This Visit    None           Tae Mancia MD

## 2024-03-21 ENCOUNTER — HOSPITAL ENCOUNTER (OUTPATIENT)
Dept: RADIOLOGY | Facility: CLINIC | Age: 79
Discharge: HOME | End: 2024-03-21
Payer: MEDICARE

## 2024-03-21 DIAGNOSIS — F17.200 ENCOUNTER FOR SCREENING FOR MALIGNANT NEOPLASM OF LUNG IN CURRENT SMOKER WITH 30 PACK YEAR HISTORY OR GREATER: ICD-10-CM

## 2024-03-21 DIAGNOSIS — Z12.2 ENCOUNTER FOR SCREENING FOR MALIGNANT NEOPLASM OF LUNG IN CURRENT SMOKER WITH 30 PACK YEAR HISTORY OR GREATER: ICD-10-CM

## 2024-03-21 DIAGNOSIS — R91.8 PULMONARY NODULES: ICD-10-CM

## 2024-03-21 DIAGNOSIS — Z87.891 PERSONAL HISTORY OF NICOTINE DEPENDENCE: ICD-10-CM

## 2024-03-21 PROCEDURE — 71271 CT THORAX LUNG CANCER SCR C-: CPT

## 2024-03-22 ENCOUNTER — HOSPITAL ENCOUNTER (OUTPATIENT)
Dept: RADIOLOGY | Facility: CLINIC | Age: 79
Discharge: HOME | End: 2024-03-22
Payer: MEDICARE

## 2024-03-22 VITALS — BODY MASS INDEX: 30.12 KG/M2 | HEIGHT: 63 IN | WEIGHT: 169.97 LBS

## 2024-03-22 DIAGNOSIS — Z12.31 ENCOUNTER FOR SCREENING MAMMOGRAM FOR BREAST CANCER: ICD-10-CM

## 2024-03-22 PROCEDURE — 77063 BREAST TOMOSYNTHESIS BI: CPT

## 2024-03-22 PROCEDURE — 77067 SCR MAMMO BI INCL CAD: CPT | Performed by: RADIOLOGY

## 2024-03-22 PROCEDURE — 77063 BREAST TOMOSYNTHESIS BI: CPT | Performed by: RADIOLOGY

## 2024-03-25 PROCEDURE — RXMED WILLOW AMBULATORY MEDICATION CHARGE

## 2024-03-26 ENCOUNTER — PHARMACY VISIT (OUTPATIENT)
Dept: PHARMACY | Facility: CLINIC | Age: 79
End: 2024-03-26
Payer: MEDICARE

## 2024-04-04 DIAGNOSIS — E78.5 HYPERLIPIDEMIA, UNSPECIFIED: ICD-10-CM

## 2024-04-04 RX ORDER — PRAVASTATIN SODIUM 80 MG/1
TABLET ORAL
Qty: 90 TABLET | Refills: 3 | Status: SHIPPED | OUTPATIENT
Start: 2024-04-04

## 2024-06-14 ENCOUNTER — APPOINTMENT (OUTPATIENT)
Dept: PRIMARY CARE | Facility: CLINIC | Age: 79
End: 2024-06-14
Payer: MEDICARE

## 2024-06-14 VITALS
HEART RATE: 70 BPM | DIASTOLIC BLOOD PRESSURE: 51 MMHG | WEIGHT: 166 LBS | SYSTOLIC BLOOD PRESSURE: 133 MMHG | OXYGEN SATURATION: 95 % | TEMPERATURE: 97.3 F | BODY MASS INDEX: 29.41 KG/M2

## 2024-06-14 DIAGNOSIS — I15.0 RENOVASCULAR HYPERTENSION: ICD-10-CM

## 2024-06-14 DIAGNOSIS — E11.9 TYPE 2 DIABETES MELLITUS WITHOUT COMPLICATION, WITHOUT LONG-TERM CURRENT USE OF INSULIN (MULTI): ICD-10-CM

## 2024-06-14 DIAGNOSIS — N18.32 CKD STAGE G3B/A2, GFR 30-44 AND ALBUMIN CREATININE RATIO 30-299 MG/G (MULTI): Primary | ICD-10-CM

## 2024-06-14 LAB — HBA1C MFR BLD: 6.5 % (ref 4.2–6.5)

## 2024-06-14 PROCEDURE — 99214 OFFICE O/P EST MOD 30 MIN: CPT | Performed by: INTERNAL MEDICINE

## 2024-06-14 PROCEDURE — 4004F PT TOBACCO SCREEN RCVD TLK: CPT | Performed by: INTERNAL MEDICINE

## 2024-06-14 PROCEDURE — 83036 HEMOGLOBIN GLYCOSYLATED A1C: CPT | Mod: CLIA WAIVED TEST | Performed by: INTERNAL MEDICINE

## 2024-06-14 PROCEDURE — 1123F ACP DISCUSS/DSCN MKR DOCD: CPT | Performed by: INTERNAL MEDICINE

## 2024-06-14 PROCEDURE — 3075F SYST BP GE 130 - 139MM HG: CPT | Performed by: INTERNAL MEDICINE

## 2024-06-14 PROCEDURE — 3078F DIAST BP <80 MM HG: CPT | Performed by: INTERNAL MEDICINE

## 2024-06-14 NOTE — PROGRESS NOTES
Subjective   Maureen Ahuja is a 79 y.o. female who presents for interval follow-up.    HPI  PMH significant for CKD, IDDM    Interim:  - met with new nephrologist just this week, found out Dr. Jane was no longer there and will be seeing Dr. Mckeon.  Labs from 6/5/2024 reviewed, stable.     She reports compliance with her medications, no physical complaints. Brother had a stroke and lives with her, caretakers come M-F.  He wheelchair bound and almost completely dependent.     Only on insulin since development of CKD until addition of Farxiga 10 mg. Used to follow with Dr. Hodge for diabetes before he retired. Measures her sugar 3 times per day.   Blood sugar log reviewed:  100-150 in the mornings, 110-140 around lunch/dinner, 170-220 at nighttime. Hi/Lo = 226/98.      Currently taking Humulin N 36 units in AM and 18 units in PM and Humulin R 4 units and 12 units.   She purposely eats more at night to avoid nighttime lows.   She denies any vision changes, numbness or pain in the feet, no chest pain shortness of breath or dizziness.  In general has been trying to watch her diet, has lost a few pounds since last visit.     She doesn't walk as much as she used to, mostly care for her brother who is wheelchair bound in the house alone.   She has been eating better, more vegetables. She loves salads, cooks herself. Had quit smoking as of July 8, 2016 (~1/2ppd for 40 years at that time) except for occasional brief lapses, but back to 10 cigs per week because of recent stress of her brother's stroke, since Dec 2020. She is trying to cut back. She knows that she wants to quit but is not ready at this time.      Nephrology-Dr. Jane  Endocrinology-Dr. Hodge (retired) - now will be this office  Ophthalmology-Dr. Tony, Dr. Goldberg     Objective   /51   Pulse 70   Temp 36.3 °C (97.3 °F)   Wt 75.3 kg (166 lb)   SpO2 95%   BMI 29.41 kg/m²    Physical Exam  Gen: NAD, pleasant, A&Ox3  HEENT: PERRL, EOMI, MMM, OP  clear  Neck: supple, no JVD, normal carotid upstroke  Pulm: lungs rhonchorous, good air movement, no wheezes or crackles  CV: RRR, no m/r/g, 2+ DP pulses  Abd: NABS, soft, NT, ND no HSM  Ext: trace - 1+ peripheral edema to ankles, feet and lower extremities without wounds  Neuro: CN II-XII intact, no focal motor deficits; decreased sensation to vibration in b/l feet      Assessment/Plan   HTN/CKD G3b w/proteinuria: nephrologist, Dr. 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 Hodge (endo) retired - managed here per her preference  -regular podiatry, Dr. Inman  -Annual ophthalmology appointments, Dr. Goldberg  -Continue aspirin for primary prevention, pt prefers to continue after discussion of risk/benefit  -Continue pravastatin for primary prevention and dyslipidemia  -Continue diet regulation  -Off metformin since VERONIQUE, started SGLT2-i in JUN 2022     Anemia: Stable borderline macrocytic, likely secondary to B12 deficiency  -Continue to B12 supplementation     Emphysema: mild, CT 3/3/2023, asymptomatic; this was done at [facility], LDCT done 3/21/2024, showing mild emphysema and chronic bronchitis, benign appearing nodules    Health maintenance  -Colonoscopy - flex sig 4/19/2023 told to repeat in 10 years  -Mammogram 6/27/2022, continue biannual  -Recommend annual influenza, Shingrix recommended, had covid vaccine J&J in 3/2021, recommend RSV, COVID booster  -Continue diet and exercise lifestyle  changes  -Patient has recent relapse, smoking 10cigs/day- contemplative stage  -Last DEXA in 12/22/2017 with normal bone density repeat in 5 years, ordered, not completed     Followup in 3-4 months or as needed  Problem List Items Addressed This Visit       Diabetes mellitus (Multi)    Relevant Orders    POCT Glycosylated Hemoglobin (HGB A1C) docked device            Tae Mancia MD

## 2024-06-24 PROCEDURE — RXMED WILLOW AMBULATORY MEDICATION CHARGE

## 2024-07-02 ENCOUNTER — PHARMACY VISIT (OUTPATIENT)
Dept: PHARMACY | Facility: CLINIC | Age: 79
End: 2024-07-02
Payer: MEDICARE

## 2024-09-16 ENCOUNTER — APPOINTMENT (OUTPATIENT)
Dept: PRIMARY CARE | Facility: CLINIC | Age: 79
End: 2024-09-16
Payer: MEDICARE

## 2024-09-16 VITALS
HEIGHT: 63 IN | WEIGHT: 165 LBS | BODY MASS INDEX: 29.23 KG/M2 | DIASTOLIC BLOOD PRESSURE: 57 MMHG | OXYGEN SATURATION: 94 % | SYSTOLIC BLOOD PRESSURE: 127 MMHG | HEART RATE: 63 BPM

## 2024-09-16 DIAGNOSIS — N18.32 CKD STAGE G3B/A2, GFR 30-44 AND ALBUMIN CREATININE RATIO 30-299 MG/G (MULTI): ICD-10-CM

## 2024-09-16 DIAGNOSIS — Z79.4 CONTROLLED TYPE 2 DIABETES MELLITUS WITH STAGE 3 CHRONIC KIDNEY DISEASE, WITH LONG-TERM CURRENT USE OF INSULIN (MULTI): ICD-10-CM

## 2024-09-16 DIAGNOSIS — E11.22 CONTROLLED TYPE 2 DIABETES MELLITUS WITH STAGE 3 CHRONIC KIDNEY DISEASE, WITH LONG-TERM CURRENT USE OF INSULIN (MULTI): ICD-10-CM

## 2024-09-16 DIAGNOSIS — N18.30 CONTROLLED TYPE 2 DIABETES MELLITUS WITH STAGE 3 CHRONIC KIDNEY DISEASE, WITH LONG-TERM CURRENT USE OF INSULIN (MULTI): ICD-10-CM

## 2024-09-16 DIAGNOSIS — F17.200 CURRENT SMOKER: ICD-10-CM

## 2024-09-16 DIAGNOSIS — I10 PRIMARY HYPERTENSION: Primary | ICD-10-CM

## 2024-09-16 LAB — HBA1C MFR BLD: 6.6 % (ref 4.2–6.5)

## 2024-09-16 PROCEDURE — 99214 OFFICE O/P EST MOD 30 MIN: CPT | Performed by: INTERNAL MEDICINE

## 2024-09-16 PROCEDURE — 1159F MED LIST DOCD IN RCRD: CPT | Performed by: INTERNAL MEDICINE

## 2024-09-16 PROCEDURE — G0008 ADMIN INFLUENZA VIRUS VAC: HCPCS | Performed by: INTERNAL MEDICINE

## 2024-09-16 PROCEDURE — 1123F ACP DISCUSS/DSCN MKR DOCD: CPT | Performed by: INTERNAL MEDICINE

## 2024-09-16 PROCEDURE — 90662 IIV NO PRSV INCREASED AG IM: CPT | Performed by: INTERNAL MEDICINE

## 2024-09-16 PROCEDURE — 3078F DIAST BP <80 MM HG: CPT | Performed by: INTERNAL MEDICINE

## 2024-09-16 PROCEDURE — 4004F PT TOBACCO SCREEN RCVD TLK: CPT | Performed by: INTERNAL MEDICINE

## 2024-09-16 PROCEDURE — 83036 HEMOGLOBIN GLYCOSYLATED A1C: CPT | Mod: CLIA WAIVED TEST | Performed by: INTERNAL MEDICINE

## 2024-09-16 PROCEDURE — 3074F SYST BP LT 130 MM HG: CPT | Performed by: INTERNAL MEDICINE

## 2024-09-16 PROCEDURE — G2211 COMPLEX E/M VISIT ADD ON: HCPCS | Performed by: INTERNAL MEDICINE

## 2024-09-16 NOTE — PROGRESS NOTES
"Subjective   Maureen Ahuja is a 79 y.o. female who presents for interval follow-up.    HPI  PMH significant for CKD, IDDM    Interim:  - met with new nephrologist, Dr. Mckeon in June, follow-up in DEC     She reports compliance with her medications, no physical complaints. Brother had a stroke and lives with her, caretakers come M-F.  He wheelchair bound and almost completely dependent.     Only on insulin since development of CKD until addition of Farxiga 10 mg.   Same regimen: Humulin N 36 units in AM and 18 units in PM and Humulin R 4 units and 12 units.   Measures her sugar 3 times per day.   Blood sugar log reviewed:  100-150 in the mornings, 110-140 around lunch/dinner, 170-220 at nighttime. Hi/Lo = 226/98.      She purposely eats more at night to avoid nighttime lows.   She denies any vision changes, numbness or pain in the feet, no chest pain shortness of breath or dizziness.  In general has been trying to watch her diet, has lost a few more pounds since last visit.     She has been eating better, more vegetables. She loves salads, cooks herself.   Had quit smoking as of July 8, 2016 (~1/2ppd for 40 years at that time) except for occasional brief lapses, but back to 7-10 cigs per week because of recent stress of her brother's stroke, since Dec 2020. She is trying to cut back. She knows that she wants to quit but is not ready at this time.      Nephrology-Dr. Jane --> [place]  Endocrinology-Dr. Hodge (retired) - now will be this office  Ophthalmology-Dr. Tony, Dr. Goldberg     Objective   /57 (BP Location: Left arm, Patient Position: Sitting)   Pulse 63   Ht 1.6 m (5' 3\")   Wt 74.8 kg (165 lb)   SpO2 94%   BMI 29.23 kg/m²    Physical Exam  Gen: NAD, pleasant, A&Ox3  HEENT: PERRL, EOMI, MMM, OP clear  Neck: supple, no JVD, normal carotid upstroke  Pulm: lungs rhonchorous, good air movement, no wheezes or crackles  CV: RRR, no m/r/g, 2+ DP pulses  Abd: NABS, soft, NT, ND no HSM  Ext: trace - 1+ " peripheral edema to ankles, feet and lower extremities without wounds  Neuro: CN II-XII intact, no focal motor deficits; decreased sensation to vibration in b/l feet      Assessment/Plan   HTN/CKD G3b w/proteinuria: nephrologist, Dr. Kelvin Jane--> [place]  -Continue amlodipine 10 mg, valsartan 320 mg, carvedilol 12.5 mg twice daily, doxazosin 2mg QHS, furosemide 20mg BID   -Continue Farxiga, indicated for diabetes and CKD with albuminuria     History of Vitamin D and B12 deficiency: Continue supplementation, last levels were normal (3/6/2024)     Diabetes adult onset insulin-dependent: 6.6% A1c today, discussed importance of avoiding hypoglycemia;   -Continue to Humulin N 36 units in AM and 18 units in PM and Humulin R 4 units and 12 units, continue; prefers not making any adjustments  - declines CGM  -Dr. Hodge ([facility]) retired - managed here per her preference  -regular podiatry, Dr. Inman  -Annual ophthalmology appointments, Dr. Goldberg, overdue  -Continue aspirin for primary prevention, pt prefers to continue after discussion of risk/benefit  -Continue pravastatin for primary prevention and dyslipidemia  -Continue diet regulation  -Off metformin since VERONIQUE, started SGLT2-i in JUN 2022     Anemia: Stable borderline macrocytic, likely secondary to B12 deficiency  -Continue to B12 supplementation    Hip/back pain: tolerating, stiff when standing up but loosens up     Emphysema: mild, CT 3/3/2023, asymptomatic; this was done at [facility], LDCT done 3/21/2024, showing mild emphysema and chronic bronchitis, benign appearing nodules    Health maintenance  -Colonoscopy - flex sig 4/19/2023 told to repeat in 10 years  -Mammogram 6/27/2022, continue biannual  -Recommend annual influenza, Shingrix recommended, had covid vaccine J&J in 3/2021, recommend RSV, COVID booster  -Continue diet and exercise lifestyle changes  -Patient has recent relapse, smoking 10cigs/week- contemplative stage, 3 min in discussion  -Last DEXA in  12/22/2017 with normal bone density repeat in 5 years, ordered, not completed     Followup in 3-4 months or as needed  Problem List Items Addressed This Visit       Controlled type 2 diabetes mellitus with stage 3 chronic kidney disease, with long-term current use of insulin (Multi)    Relevant Orders    POCT Glycosylated Hemoglobin (HGB A1C) docked device (Completed)            Tae Mancia MD

## 2024-09-23 DIAGNOSIS — Z79.4 TYPE 2 DIABETES MELLITUS WITH MICROALBUMINURIA, WITH LONG-TERM CURRENT USE OF INSULIN (MULTI): ICD-10-CM

## 2024-09-23 DIAGNOSIS — R80.9 TYPE 2 DIABETES MELLITUS WITH MICROALBUMINURIA, WITH LONG-TERM CURRENT USE OF INSULIN (MULTI): ICD-10-CM

## 2024-09-23 DIAGNOSIS — E11.29 TYPE 2 DIABETES MELLITUS WITH MICROALBUMINURIA, WITH LONG-TERM CURRENT USE OF INSULIN (MULTI): ICD-10-CM

## 2024-09-23 RX ORDER — DAPAGLIFLOZIN 10 MG/1
10 TABLET, FILM COATED ORAL DAILY
Qty: 90 TABLET | Refills: 3 | Status: SHIPPED | OUTPATIENT
Start: 2024-09-23 | End: 2025-09-23

## 2024-09-24 PROCEDURE — RXMED WILLOW AMBULATORY MEDICATION CHARGE

## 2024-09-25 ENCOUNTER — PHARMACY VISIT (OUTPATIENT)
Dept: PHARMACY | Facility: CLINIC | Age: 79
End: 2024-09-25
Payer: MEDICARE

## 2024-10-30 DIAGNOSIS — F17.200 CURRENT SMOKER: ICD-10-CM

## 2024-10-30 DIAGNOSIS — Z00.00 ROUTINE GENERAL MEDICAL EXAMINATION AT HEALTH CARE FACILITY: ICD-10-CM

## 2024-10-30 DIAGNOSIS — I15.0 RENOVASCULAR HYPERTENSION: ICD-10-CM

## 2024-10-30 DIAGNOSIS — Z12.31 ENCOUNTER FOR SCREENING MAMMOGRAM FOR BREAST CANCER: ICD-10-CM

## 2024-10-30 DIAGNOSIS — E11.9 TYPE 2 DIABETES MELLITUS WITHOUT COMPLICATIONS (MULTI): ICD-10-CM

## 2024-10-30 DIAGNOSIS — N18.32 CKD STAGE G3B/A2, GFR 30-44 AND ALBUMIN CREATININE RATIO 30-299 MG/G (MULTI): ICD-10-CM

## 2024-10-30 RX ORDER — INSULIN HUMAN 100 [IU]/ML
INJECTION, SOLUTION PARENTERAL
Qty: 10 ML | Refills: 3 | Status: SHIPPED | OUTPATIENT
Start: 2024-10-30

## 2024-12-23 PROCEDURE — RXMED WILLOW AMBULATORY MEDICATION CHARGE

## 2025-01-03 ENCOUNTER — PHARMACY VISIT (OUTPATIENT)
Dept: PHARMACY | Facility: CLINIC | Age: 80
End: 2025-01-03
Payer: MEDICARE

## 2025-01-17 ENCOUNTER — APPOINTMENT (OUTPATIENT)
Dept: PRIMARY CARE | Facility: CLINIC | Age: 80
End: 2025-01-17
Payer: MEDICARE

## 2025-01-17 VITALS
WEIGHT: 157.1 LBS | TEMPERATURE: 97.3 F | HEART RATE: 65 BPM | HEIGHT: 62 IN | SYSTOLIC BLOOD PRESSURE: 127 MMHG | DIASTOLIC BLOOD PRESSURE: 67 MMHG | OXYGEN SATURATION: 92 % | BODY MASS INDEX: 28.91 KG/M2

## 2025-01-17 DIAGNOSIS — J43.2 CENTRILOBULAR EMPHYSEMA (MULTI): ICD-10-CM

## 2025-01-17 DIAGNOSIS — Z12.31 ENCOUNTER FOR SCREENING MAMMOGRAM FOR BREAST CANCER: ICD-10-CM

## 2025-01-17 DIAGNOSIS — F17.200 CURRENT SMOKER: ICD-10-CM

## 2025-01-17 DIAGNOSIS — N18.32 CKD STAGE G3B/A2, GFR 30-44 AND ALBUMIN CREATININE RATIO 30-299 MG/G (MULTI): Primary | ICD-10-CM

## 2025-01-17 DIAGNOSIS — E11.9 TYPE 2 DIABETES MELLITUS WITHOUT COMPLICATIONS (MULTI): ICD-10-CM

## 2025-01-17 DIAGNOSIS — Z79.4 TYPE 2 DIABETES MELLITUS WITH MICROALBUMINURIA, WITH LONG-TERM CURRENT USE OF INSULIN (MULTI): ICD-10-CM

## 2025-01-17 DIAGNOSIS — M31.6 GCA (GIANT CELL ARTERITIS) (MULTI): ICD-10-CM

## 2025-01-17 DIAGNOSIS — I15.0 RENOVASCULAR HYPERTENSION: ICD-10-CM

## 2025-01-17 DIAGNOSIS — R80.9 TYPE 2 DIABETES MELLITUS WITH MICROALBUMINURIA, WITH LONG-TERM CURRENT USE OF INSULIN (MULTI): ICD-10-CM

## 2025-01-17 DIAGNOSIS — Z00.00 ROUTINE GENERAL MEDICAL EXAMINATION AT HEALTH CARE FACILITY: ICD-10-CM

## 2025-01-17 DIAGNOSIS — E11.29 TYPE 2 DIABETES MELLITUS WITH MICROALBUMINURIA, WITH LONG-TERM CURRENT USE OF INSULIN (MULTI): ICD-10-CM

## 2025-01-17 LAB
ALBUMIN SERPL BCP-MCNC: 3.3 G/DL (ref 3.4–5)
ANION GAP SERPL CALC-SCNC: 16 MMOL/L (ref 10–20)
BUN SERPL-MCNC: 38 MG/DL (ref 6–23)
CALCIUM SERPL-MCNC: 9.6 MG/DL (ref 8.6–10.6)
CHLORIDE SERPL-SCNC: 103 MMOL/L (ref 98–107)
CO2 SERPL-SCNC: 27 MMOL/L (ref 21–32)
CREAT SERPL-MCNC: 1.73 MG/DL (ref 0.5–1.05)
EGFRCR SERPLBLD CKD-EPI 2021: 30 ML/MIN/1.73M*2
GLUCOSE SERPL-MCNC: 233 MG/DL (ref 74–99)
PHOSPHATE SERPL-MCNC: 3.2 MG/DL (ref 2.5–4.9)
POTASSIUM SERPL-SCNC: 4.5 MMOL/L (ref 3.5–5.3)
SODIUM SERPL-SCNC: 141 MMOL/L (ref 136–145)

## 2025-01-17 PROCEDURE — 99215 OFFICE O/P EST HI 40 MIN: CPT | Performed by: INTERNAL MEDICINE

## 2025-01-17 PROCEDURE — 3078F DIAST BP <80 MM HG: CPT | Performed by: INTERNAL MEDICINE

## 2025-01-17 PROCEDURE — G2211 COMPLEX E/M VISIT ADD ON: HCPCS | Performed by: INTERNAL MEDICINE

## 2025-01-17 PROCEDURE — 1123F ACP DISCUSS/DSCN MKR DOCD: CPT | Performed by: INTERNAL MEDICINE

## 2025-01-17 PROCEDURE — 80069 RENAL FUNCTION PANEL: CPT

## 2025-01-17 PROCEDURE — 3074F SYST BP LT 130 MM HG: CPT | Performed by: INTERNAL MEDICINE

## 2025-01-17 PROCEDURE — 1159F MED LIST DOCD IN RCRD: CPT | Performed by: INTERNAL MEDICINE

## 2025-01-17 RX ORDER — INSULIN HUMAN 100 [IU]/ML
INJECTION, SUSPENSION SUBCUTANEOUS
Qty: 200 ML | Refills: 1 | Status: SHIPPED | OUTPATIENT
Start: 2025-01-17

## 2025-01-17 RX ORDER — INSULIN HUMAN 100 [IU]/ML
INJECTION, SOLUTION PARENTERAL
Qty: 10 ML | Refills: 3 | Status: SHIPPED | OUTPATIENT
Start: 2025-01-17

## 2025-01-17 ASSESSMENT — ENCOUNTER SYMPTOMS
DEPRESSION: 0
LOSS OF SENSATION IN FEET: 0
OCCASIONAL FEELINGS OF UNSTEADINESS: 0

## 2025-01-17 NOTE — PROGRESS NOTES
Subjective   Maureen Ahuja is a 79 y.o. female who presents for hospital follow-up.    HPI  PMH significant for CKD, IDDM, HTN and GCA    Interim:  -Does not appear she had nephrology follow-up since last visit, Dr. Trivedi  -Admitted 1/3/2025 through 1/13/2025 with GCA  -Saw hematology, Dr. Irving Ibarra for hospital follow-up    She is accompanied today by her niece.    She had developed vision loss and jaw claudication, starting about a week prior, seen by ophthalmology who recommended going to the ER with suspicion of arteritis.  She went to the local ED but transferred to [facility].  She was found to have elevated ESR and CRP, MRI brain and orbits were unremarkable and she was started on 1000 mg of IV methylprednisolone for 3 days before switching to high-dose prednisone.  Other causes were evaluated and ruled out.  She had consultations with endocrinology, ophthalmology and rheumatology.  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  Her insulin regimen was changed from Humulin N 36 units in AM and 18 units in PM and Humulin R 4 units and 12 units to Humulin and 18 units in the a.m. and 6 units in the p.m., 5 units before meals with corrective scale insulin.  Her ARB was held due to some azotemia.  At the time of discharge her steroid taper was 50 mg for 1 week followed by 40 mg until rheumatology follow-up and also started on Bactrim for PJP prophylaxis.  Initial rheumatology follow-up already completed on 1/14/2025, at the time he did not have the biopsy results yet.  Also ordered echocardiogram and ultrasound of the abdominal aorta, recommended continuing weekly taper of prednisone until 20 mg daily and staying on that for a while.  Also ordered DEXA, continued PJP prophylaxis, vitamin D.    She is concerned about her blood sugars, in the few days that she has been home her morning blood sugars have been between 117 and 148 but before lunch they can be 300 and higher and by dinner  "they could be over 500.  She is currently checking her blood sugars before meals and at bedtime, 4 times a day but she has not been using her sliding scale because she is worried about dropping too low.    **COPIED FORWARD FOR REFERENCE**     Brother had a stroke and lives with her, caretakers come M-F.  He is wheelchair bound and almost completely dependent.      She denies any vision changes, numbness or pain in the feet, no chest pain shortness of breath or dizziness.  In general has been trying to watch her diet, has lost a few more pounds since last visit.     She has been eating better, more vegetables. She loves salads, cooks herself.   Had quit smoking as of July 8, 2016 (~1/2ppd for 40 years at that time) except for occasional brief lapses, but back to 7-10 cigs per week because of recent stress of her brother's stroke, since Dec 2020. She is trying to cut back. She knows that she wants to quit but is not ready at this time.      Nephrology-Dr. Jane --> [facility]  Endocrinology-Dr. Hodge (retired) - now will be this office  Ophthalmology-Dr. Tony, Dr. Goldberg     Objective   /67 (BP Location: Left arm, Patient Position: Sitting, BP Cuff Size: Small adult)   Pulse 65   Temp 36.3 °C (97.3 °F) (Temporal)   Ht 1.575 m (5' 2\")   Wt 71.3 kg (157 lb 1.6 oz)   SpO2 92%   BMI 28.73 kg/m²    Physical Exam  Gen: NAD, pleasant, A&Ox3  HEENT: PERRL, EOMI, MMM, OP clear  Neck: supple, no JVD, normal carotid upstroke  Pulm: lungs rhonchorous, good air movement, no wheezes or crackles  CV: RRR, no m/r/g, 2+ DP pulses  Abd: NABS, soft, NT, ND no HSM  Ext: trace - 1+ peripheral edema to ankles, feet and lower extremities without wounds  Neuro: CN II-XII intact, no focal motor deficits; decreased sensation to vibration in b/l feet      Assessment/Plan   HTN/CKD G3b w/proteinuria: nephrologist, Dr. Kelvin Jane--> [place]  -Continue amlodipine 10 mg, carvedilol 12.5 mg twice daily, doxazosin 2mg QHS, furosemide " 20mg BID   -Continue Farxiga, indicated for diabetes and CKD with albuminuria  -Her blood pressure is adequately controlled even without the valsartan but she should be on ARB due to kidney disease.  We will check her renal function and if it is stable I will have her restart on half prior dose of valsartan changing to 160 mg daily and decreasing her amlodipine from 10 mg to 5 mg daily in the short-term    GCA:  -Currently on prednisone taper  -Has follow-up with rheumatology and ophthalmology    Diabetes adult onset insulin-dependent: 6.6% A1c at last visit, discussed importance of avoiding hypoglycemia;   -Continue lower basal insulin with corrective preprandial insulin given expectation of fluctuating blood sugars while on steroids   -Continue Humulin N 18 units before breakfast and 6 units before supper  -Change Humulin R to 4 units before meals plus corrective scale with a maximum dose of 10 units total given her history and concerns about hypoglycemia  - declines CGM  -Dr. Hodge ([facility]) retired - managed here per her preference  -regular podiatry, Dr. Inman  -Annual ophthalmology appointments, Dr. Goldberg, overdue  -Continue aspirin for primary prevention, pt prefers to continue after discussion of risk/benefit  -Continue pravastatin for primary prevention and dyslipidemia  -Continue diet regulation  -Off metformin since VERONIQUE, started SGLT2-i in JUN 2022    **COPIED FORWARD FOR REFERENCE**     History of Vitamin D and B12 deficiency: Continue supplementation, last levels were normal (3/6/2024)        Anemia: Stable borderline macrocytic, likely secondary to B12 deficiency  -Continue to B12 supplementation    Hip/back pain: tolerating, stiff when standing up but loosens up     Emphysema: mild, CT 3/3/2023, asymptomatic; this was done at [facility], LDCT done 3/21/2024, showing mild emphysema and chronic bronchitis, benign appearing nodules    Health maintenance  -Colonoscopy - flex sig 4/19/2023 told to repeat in  10 years  -Mammogram 6/27/2022, continue biannual  -Recommend annual influenza, Shingrix recommended, had covid vaccine J&J in 3/2021, recommend RSV, COVID booster  -Continue diet and exercise lifestyle changes  -Patient has recent relapse, smoking 10cigs/week- contemplative stage, 3 min in discussion  -Last DEXA in 12/22/2017 with normal bone density repeat in 5 years, ordered, not completed     Followup in 3-4 months or as needed  Problem List Items Addressed This Visit       CKD stage G3b/A2, GFR 30-44 and albumin creatinine ratio  mg/g (Multi) - Primary    Relevant Orders    Renal Function Panel            Tae Mancia MD

## 2025-01-22 ENCOUNTER — TELEPHONE (OUTPATIENT)
Dept: PRIMARY CARE | Facility: CLINIC | Age: 80
End: 2025-01-22
Payer: MEDICARE

## 2025-01-22 NOTE — TELEPHONE ENCOUNTER
----- Message from Tae Mancia sent at 1/20/2025 10:32 PM EST -----  Stable renal function, not quite back to prior baseline.  Continue current medication regimen until follow up with nephrologist.

## 2025-01-22 NOTE — TELEPHONE ENCOUNTER
Result Communication    Resulted Orders   Renal Function Panel   Result Value Ref Range    Glucose 233 (H) 74 - 99 mg/dL    Sodium 141 136 - 145 mmol/L    Potassium 4.5 3.5 - 5.3 mmol/L    Chloride 103 98 - 107 mmol/L    Bicarbonate 27 21 - 32 mmol/L    Anion Gap 16 10 - 20 mmol/L    Urea Nitrogen 38 (H) 6 - 23 mg/dL    Creatinine 1.73 (H) 0.50 - 1.05 mg/dL    eGFR 30 (L) >60 mL/min/1.73m*2      Comment:      Calculations of estimated GFR are performed using the 2021 CKD-EPI Study Refit equation without the race variable for the IDMS-Traceable creatinine methods.  https://jasn.asnjournals.org/content/early/2021/09/22/ASN.9724256090    Calcium 9.6 8.6 - 10.6 mg/dL    Phosphorus 3.2 2.5 - 4.9 mg/dL      Comment:      The performance characteristics of phosphorus testing in heparinized plasma have been validated by the individual  laboratory site where testing is performed. Testing on heparinized plasma is not approved by the FDA; however, such approval is not necessary.    Albumin 3.3 (L) 3.4 - 5.0 g/dL       3:19 PM      Results were successfully communicated with the patient and they acknowledged their understanding.

## 2025-02-18 ENCOUNTER — APPOINTMENT (OUTPATIENT)
Dept: PRIMARY CARE | Facility: CLINIC | Age: 80
End: 2025-02-18
Payer: MEDICARE

## 2025-02-21 ENCOUNTER — APPOINTMENT (OUTPATIENT)
Dept: PRIMARY CARE | Facility: CLINIC | Age: 80
End: 2025-02-21
Payer: MEDICARE

## 2025-02-21 VITALS
HEIGHT: 63 IN | BODY MASS INDEX: 29.2 KG/M2 | OXYGEN SATURATION: 95 % | HEART RATE: 72 BPM | DIASTOLIC BLOOD PRESSURE: 79 MMHG | TEMPERATURE: 97.6 F | WEIGHT: 164.8 LBS | SYSTOLIC BLOOD PRESSURE: 150 MMHG

## 2025-02-21 DIAGNOSIS — I10 PRIMARY HYPERTENSION: ICD-10-CM

## 2025-02-21 DIAGNOSIS — Z79.4 TYPE 2 DIABETES MELLITUS WITHOUT COMPLICATION, WITH LONG-TERM CURRENT USE OF INSULIN (MULTI): ICD-10-CM

## 2025-02-21 DIAGNOSIS — E11.9 TYPE 2 DIABETES MELLITUS WITHOUT COMPLICATION, WITH LONG-TERM CURRENT USE OF INSULIN (MULTI): ICD-10-CM

## 2025-02-21 DIAGNOSIS — M31.6 GCA (GIANT CELL ARTERITIS) (MULTI): ICD-10-CM

## 2025-02-21 DIAGNOSIS — N18.32 CKD STAGE G3B/A2, GFR 30-44 AND ALBUMIN CREATININE RATIO 30-299 MG/G (MULTI): Primary | ICD-10-CM

## 2025-02-21 DIAGNOSIS — E11.9 TYPE 2 DIABETES MELLITUS WITHOUT COMPLICATIONS (MULTI): ICD-10-CM

## 2025-02-21 PROCEDURE — 1159F MED LIST DOCD IN RCRD: CPT | Performed by: INTERNAL MEDICINE

## 2025-02-21 PROCEDURE — 99215 OFFICE O/P EST HI 40 MIN: CPT | Performed by: INTERNAL MEDICINE

## 2025-02-21 PROCEDURE — 1123F ACP DISCUSS/DSCN MKR DOCD: CPT | Performed by: INTERNAL MEDICINE

## 2025-02-21 PROCEDURE — G2211 COMPLEX E/M VISIT ADD ON: HCPCS | Performed by: INTERNAL MEDICINE

## 2025-02-21 PROCEDURE — 3078F DIAST BP <80 MM HG: CPT | Performed by: INTERNAL MEDICINE

## 2025-02-21 PROCEDURE — 3077F SYST BP >= 140 MM HG: CPT | Performed by: INTERNAL MEDICINE

## 2025-02-21 RX ORDER — VALSARTAN 320 MG/1
160 TABLET ORAL DAILY
Qty: 45 TABLET | Refills: 3 | Status: SHIPPED | OUTPATIENT
Start: 2025-02-21 | End: 2026-02-21

## 2025-02-21 RX ORDER — INSULIN HUMAN 100 [IU]/ML
INJECTION, SUSPENSION SUBCUTANEOUS
Qty: 200 ML | Refills: 1 | Status: SHIPPED | OUTPATIENT
Start: 2025-02-21

## 2025-02-21 ASSESSMENT — ENCOUNTER SYMPTOMS
OCCASIONAL FEELINGS OF UNSTEADINESS: 0
LOSS OF SENSATION IN FEET: 0
DEPRESSION: 0

## 2025-02-21 NOTE — PROGRESS NOTES
Subjective   Maureen Ahuja is a 79 y.o. female who presents for hospital follow-up.    HPI  PMH significant for CKD, IDDM, HTN and GCA (see visit 1/17/2025)    Interim:  - nephrology follow-up, Dr. Trivedi, 2/5/2025, no changes    Vision is getting better.  Needs labs monthly per rheumatology, requests getting them today.  Frustrated by blood sugar fluctuations since last visit.  She did not start back on her ARB after last visit and her nephrologist did not address.    With her hospital stay her insulin regimen was changed (see visit 1/17/2025).  She was having very elevated glucose levels during the day and her insulin regimen was adjusted, however because of the multiple changes she thought we were adding dosing and as a result she has been taking instead of Humulin and 18 units and 6 units before breakfast and supper respectively and Humulin R 3 times a day before meals:  NPH 18 units around 6 AM and then nothing before breakfast (10 AM)  NPH 6 units and regular insulin 2 to 4 units around noon before lunch around noon  NPH 6 units and regular insulin 2 to 5 units before dinner around 5 PM  NPH 10 units at bedtime.  As such she has had low blood sugars and hypoglycemia in the morning and, especially later in the day, blood sugars in the 200 and 300 range.  Sense of time spent educating and reviewing the actions of her 2 types of insulins and when to take them.  She has essentially flipped the prescription taking NPH 4 times a day and regular insulin twice a day.    Her ARB was held due to some azotemia.  At the time of discharge her steroid taper was 50 mg for 1 week followed by 40 mg until rheumatology follow-up and also started on Bactrim for PJP prophylaxis.  Initial rheumatology follow-up already completed on 1/14/2025, at the time he did not have the biopsy results yet.  Also ordered echocardiogram and ultrasound of the abdominal aorta, recommended continuing weekly taper of prednisone until 20 mg daily and  "staying on that for a while.  Also ordered DEXA, continued PJP prophylaxis, vitamin D.      **COPIED FORWARD FOR REFERENCE**     Brother had a stroke and lives with her, caretakers come M-F.  He is wheelchair bound and almost completely dependent.      She denies any vision changes, numbness or pain in the feet, no chest pain shortness of breath or dizziness.  In general has been trying to watch her diet, has lost a few more pounds since last visit.     She has been eating better, more vegetables. She loves salads, cooks herself.   Had quit smoking as of July 8, 2016 (~1/2ppd for 40 years at that time) except for occasional brief lapses, but back to 7-10 cigs per week because of recent stress of her brother's stroke, since Dec 2020. She is trying to cut back. She knows that she wants to quit but is not ready at this time.      Nephrology-Dr. Jane --> [facility]  Rheumatology-Dr. Irving Ibarra  Endocrinology-Dr. Hodge (retired) - now will be this office  Ophthalmology-Dr. Tony, Dr. Goldberg     Objective   /79 (BP Location: Left arm, Patient Position: Sitting, BP Cuff Size: Adult)   Pulse 72   Temp 36.4 °C (97.6 °F) (Temporal)   Ht 1.6 m (5' 3\")   Wt 74.8 kg (164 lb 12.8 oz)   SpO2 95%   BMI 29.19 kg/m²    Physical Exam  Gen: NAD, pleasant, A&Ox3  HEENT: PERRL, EOMI, MMM, OP clear  Neck: supple, no JVD, normal carotid upstroke  Pulm: lungs rhonchorous, good air movement, no wheezes or crackles  CV: RRR, no m/r/g, 2+ DP pulses  Abd: NABS, soft, NT, ND no HSM  Ext: trace - 1+ peripheral edema to ankles, feet and lower extremities without wounds  Neuro: CN II-XII intact, no focal motor deficits; decreased sensation to vibration in b/l feet      Assessment/Plan   HTN/CKD G3b w/proteinuria: nephrologist, Dr. Kelvin Jane--> [facility]  -Continue amlodipine 10 mg, carvedilol 12.5 mg twice daily, doxazosin 2mg QHS, furosemide 20mg BID   -Continue Farxiga, indicated for diabetes and CKD with albuminuria  -Her blood " pressure is elevated without the valsartan and this was not addressed at her nephrology appointment, she should be on ARB due to kidney disease.    -restart on half prior dose of valsartan changing to 160 mg daily and in the future considering titrating up her ARB and potentially decreasing her amlodipine    GCA:  -Currently on prednisone taper  -Has follow-up with rheumatology and ophthalmology    Diabetes adult onset insulin-dependent: 6.6% A1c at last visit, discussed importance of avoiding hypoglycemia;   -Continue lower basal insulin with corrective preprandial insulin given expectation of fluctuating blood sugars while on steroids   -Take Humulin N 20 units before breakfast and 12 units before supper  -Continue Humulin R 4 units before each meal plus corrective scale with a maximum dose of 10 units total given her history and concerns about hypoglycemia  - declines CGM  -Dr. Hodge ([facility]) retired - managed here per her preference  -regular podiatry, Dr. Inman  -Annual ophthalmology appointments, Dr. Goldberg, overdue  -Continue aspirin for primary prevention, pt prefers to continue after discussion of risk/benefit  -Continue pravastatin for primary prevention and dyslipidemia  -Continue diet regulation  -Off metformin since VERONIQUE, started SGLT2-i in JUN 2022    **COPIED FORWARD FOR REFERENCE**     History of Vitamin D and B12 deficiency: Continue supplementation, last levels were normal (3/6/2024)        Anemia: Stable borderline macrocytic, likely secondary to B12 deficiency  -Continue to B12 supplementation    Hip/back pain: tolerating, stiff when standing up but loosens up     Emphysema: mild, CT 3/3/2023, asymptomatic; this was done at [facility], LDCT done 3/21/2024, showing mild emphysema and chronic bronchitis, benign appearing nodules    Health maintenance  -Colonoscopy - flex sig 4/19/2023 told to repeat in 10 years  -Mammogram 6/27/2022, continue biannual  -Recommend annual influenza, Shingrix  recommended, had covid vaccine J&J in 3/2021, recommend RSV, COVID booster  -Continue diet and exercise lifestyle changes  -Patient has recent relapse, smoking 10cigs/week- contemplative stage, 3 min in discussion  -Last DEXA in 12/22/2017 with normal bone density repeat in 5 years, ordered, not completed     Followup in 3-4 months or as needed  Problem List Items Addressed This Visit    None             Tae Mancia MD

## 2025-02-25 DIAGNOSIS — F17.200 ENCOUNTER FOR SCREENING FOR MALIGNANT NEOPLASM OF LUNG IN CURRENT SMOKER WITH 30 PACK YEAR HISTORY OR GREATER: Primary | ICD-10-CM

## 2025-02-25 DIAGNOSIS — Z87.891 PERSONAL HISTORY OF NICOTINE DEPENDENCE: ICD-10-CM

## 2025-02-25 DIAGNOSIS — Z12.2 ENCOUNTER FOR SCREENING FOR MALIGNANT NEOPLASM OF LUNG IN CURRENT SMOKER WITH 30 PACK YEAR HISTORY OR GREATER: Primary | ICD-10-CM

## 2025-02-25 NOTE — PROGRESS NOTES
Subjective   Patient ID: Maureen Ahuja is a 79 y.o. female who presents for No chief complaint on file..      Objective   Physical Exam    There were no vitals taken for this visit.       Assessment/Plan         Tae Mancia MD

## 2025-03-06 DIAGNOSIS — E78.5 HYPERLIPIDEMIA, UNSPECIFIED: ICD-10-CM

## 2025-03-07 RX ORDER — PRAVASTATIN SODIUM 80 MG/1
TABLET ORAL
Qty: 90 TABLET | Refills: 3 | Status: SHIPPED | OUTPATIENT
Start: 2025-03-07

## 2025-03-24 ENCOUNTER — HOSPITAL ENCOUNTER (OUTPATIENT)
Dept: RADIOLOGY | Facility: HOSPITAL | Age: 80
Discharge: HOME | End: 2025-03-24
Payer: MEDICARE

## 2025-03-24 DIAGNOSIS — F17.200 ENCOUNTER FOR SCREENING FOR MALIGNANT NEOPLASM OF LUNG IN CURRENT SMOKER WITH 30 PACK YEAR HISTORY OR GREATER: ICD-10-CM

## 2025-03-24 DIAGNOSIS — Z12.2 ENCOUNTER FOR SCREENING FOR MALIGNANT NEOPLASM OF LUNG IN CURRENT SMOKER WITH 30 PACK YEAR HISTORY OR GREATER: ICD-10-CM

## 2025-03-24 DIAGNOSIS — Z87.891 PERSONAL HISTORY OF NICOTINE DEPENDENCE: ICD-10-CM

## 2025-03-24 PROCEDURE — 71271 CT THORAX LUNG CANCER SCR C-: CPT

## 2025-03-24 PROCEDURE — 71271 CT THORAX LUNG CANCER SCR C-: CPT | Performed by: RADIOLOGY

## 2025-04-04 ENCOUNTER — APPOINTMENT (OUTPATIENT)
Dept: PRIMARY CARE | Facility: CLINIC | Age: 80
End: 2025-04-04
Payer: MEDICARE

## 2025-04-04 VITALS
TEMPERATURE: 97.1 F | WEIGHT: 163.6 LBS | BODY MASS INDEX: 28.99 KG/M2 | SYSTOLIC BLOOD PRESSURE: 145 MMHG | DIASTOLIC BLOOD PRESSURE: 77 MMHG | HEIGHT: 63 IN | OXYGEN SATURATION: 97 % | HEART RATE: 66 BPM

## 2025-04-04 DIAGNOSIS — E11.22 CONTROLLED TYPE 2 DIABETES MELLITUS WITH STAGE 3 CHRONIC KIDNEY DISEASE, WITH LONG-TERM CURRENT USE OF INSULIN (MULTI): ICD-10-CM

## 2025-04-04 DIAGNOSIS — R80.9 DIABETES MELLITUS WITH MICROALBUMINURIA (MULTI): ICD-10-CM

## 2025-04-04 DIAGNOSIS — I25.10 ATHEROSCLEROSIS OF NATIVE CORONARY ARTERY OF NATIVE HEART WITHOUT ANGINA PECTORIS: ICD-10-CM

## 2025-04-04 DIAGNOSIS — N18.32 CKD STAGE G3B/A2, GFR 30-44 AND ALBUMIN CREATININE RATIO 30-299 MG/G (MULTI): ICD-10-CM

## 2025-04-04 DIAGNOSIS — I10 PRIMARY HYPERTENSION: ICD-10-CM

## 2025-04-04 DIAGNOSIS — I70.0 THORACIC AORTIC ATHEROSCLEROSIS (CMS-HCC): ICD-10-CM

## 2025-04-04 DIAGNOSIS — Z79.4 CONTROLLED TYPE 2 DIABETES MELLITUS WITH STAGE 3 CHRONIC KIDNEY DISEASE, WITH LONG-TERM CURRENT USE OF INSULIN (MULTI): ICD-10-CM

## 2025-04-04 DIAGNOSIS — N18.30 CONTROLLED TYPE 2 DIABETES MELLITUS WITH STAGE 3 CHRONIC KIDNEY DISEASE, WITH LONG-TERM CURRENT USE OF INSULIN (MULTI): ICD-10-CM

## 2025-04-04 DIAGNOSIS — Z00.00 ROUTINE GENERAL MEDICAL EXAMINATION AT HEALTH CARE FACILITY: Primary | ICD-10-CM

## 2025-04-04 DIAGNOSIS — E11.29 DIABETES MELLITUS WITH MICROALBUMINURIA (MULTI): ICD-10-CM

## 2025-04-04 DIAGNOSIS — E11.9 TYPE 2 DIABETES MELLITUS WITHOUT COMPLICATIONS: ICD-10-CM

## 2025-04-04 RX ORDER — INSULIN HUMAN 100 [IU]/ML
INJECTION, SUSPENSION SUBCUTANEOUS
Qty: 200 ML | Refills: 1 | Status: SHIPPED | OUTPATIENT
Start: 2025-04-04

## 2025-04-04 ASSESSMENT — ENCOUNTER SYMPTOMS
LOSS OF SENSATION IN FEET: 0
DEPRESSION: 0
OCCASIONAL FEELINGS OF UNSTEADINESS: 0

## 2025-04-04 ASSESSMENT — ACTIVITIES OF DAILY LIVING (ADL)
MANAGING_FINANCES: INDEPENDENT
DOING_HOUSEWORK: INDEPENDENT
GROCERY_SHOPPING: INDEPENDENT
TAKING_MEDICATION: INDEPENDENT
DRESSING: INDEPENDENT
BATHING: INDEPENDENT

## 2025-04-04 ASSESSMENT — PATIENT HEALTH QUESTIONNAIRE - PHQ9
2. FEELING DOWN, DEPRESSED OR HOPELESS: NOT AT ALL
1. LITTLE INTEREST OR PLEASURE IN DOING THINGS: NOT AT ALL
SUM OF ALL RESPONSES TO PHQ9 QUESTIONS 1 AND 2: 0

## 2025-04-04 NOTE — PROGRESS NOTES
Subjective   Maureen Ahuja is a 80 y.o. female who presents for hospital follow-up.    HPI  PMH significant for CKD, IDDM, HTN and GCA (see visit 1/17/2025)    Vision is getting better but still unable to drive.  Needs labs monthly per rheumatology, continues on steroid taper, currently on 15 mg prednisone daily for 2 weeks after which she will go down to 12.5 mg.    She is very happy with the insulin regimen she is currently on.  When she got the hang of giving herself sliding scale insulin she has seen that her blood sugars are very well-controlled.  Currently taking Humulin N 20 units before breakfast and 10 units before supper.  Using corrective scale her preprandial insulin ranges from 4-8 units on average.  Reports that her blood sugar this morning was 90.  She has not had anything below 80 and generally blood sugars are well-controlled without any significant high values either.    She is having increased swelling in the lower extremities which is bothering her, says she still makes quite a lot of urine and takes furosemide twice daily.  Reports that her legs improve with elevation and compression.       Brother had a stroke and lives with her, caretakers come M-F.  He is wheelchair bound and almost completely dependent.      She denies any vision changes, numbness or pain in the feet, no chest pain shortness of breath or dizziness.  In general has been trying to watch her diet.     She has been eating better, more vegetables. She loves salads, cooks herself.   Had quit smoking as of July 8, 2016 (~1/2ppd for 40 years at that time) except for occasional brief lapses, but back to 7-10 cigs per week because of recent stress of her brother's stroke, since Dec 2020. She is trying to cut back. She knows that she wants to quit but is not ready at this time.  Not interested in assistance, greater than 3 minutes spent in discussion.     Nephrology-Dr. Jane --> Shikha  Rheumatology-Dr. Irving Ibarra  Endocrinology-  "Naveen (retired) - now will be this office  Ophthalmology-Dr. Tony, Dr. Goldberg     Objective   /77 (BP Location: Left arm, Patient Position: Sitting, BP Cuff Size: Adult)   Pulse 66   Temp 36.2 °C (97.1 °F) (Temporal)   Ht 1.6 m (5' 3\")   Wt 74.2 kg (163 lb 9.6 oz)   SpO2 97%   BMI 28.98 kg/m²    Physical Exam  Gen: NAD, pleasant, A&Ox3  HEENT: PERRL, EOMI, MMM, OP clear  Neck: supple, no JVD, normal carotid upstroke  Pulm: lungs rhonchorous, good air movement, no wheezes or crackles  CV: RRR, no m/r/g, 2+ DP pulses  Abd: NABS, soft, NT, ND no HSM  Ext: 2+ peripheral edema to ankles, feet and lower extremities without wounds  Neuro: CN II-XII intact, no focal motor deficits; decreased sensation to vibration in b/l feet      Assessment/Plan   HTN/CKD G3b w/proteinuria: nephrologist, Dr. Kelvin Jane--> Trivedi  -Continue amlodipine 10 mg, carvedilol 12.5 mg twice daily, doxazosin 2mg QHS, furosemide 20mg BID   -Continue Farxiga, indicated for diabetes and CKD with albuminuria  -Continue valsartan 160 mg daily for now, in the future considering titrating up her ARB and potentially decreasing her amlodipine if needed to optimize RAAS inhibition    GCA:  -Currently on prednisone taper  -Has follow-up with rheumatology and ophthalmology    Diabetes adult onset insulin-dependent: 6.6% A1c at last visit, discussed importance of avoiding hypoglycemia;   -Continue lower basal insulin with corrective preprandial insulin given expectation of fluctuating blood sugars while on steroids,, discussed monitoring closely for lower blood sugars as she tapers off the prednisone  -Continue Humulin N 20 units before breakfast and 10 units before supper  -Continue Humulin R 4 units before each meal plus corrective scale with a maximum dose of 10 units total given her history and concerns about hypoglycemia  - declines CGM  -Dr. Hodge (endo) retired - managed here per her preference  -regular podiatry, Dr." Henny  -Annual ophthalmology appointments, Dr. 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 stable 3/24/2025    History of Vitamin D and B12 deficiency: Continue supplementation, last levels were normal (3/6/2024)        Anemia: Stable borderline macrocytic, likely secondary to B12 deficiency  -Continue to B12 supplementation    Hip/back pain: tolerating, stiff when standing up but loosens up     Emphysema: mild, CT 3/3/2023, asymptomatic; this was done at [facility], LDCT done 3/24/2025, showing mild emphysema and chronic bronchitis, benign appearing nodules, relatively stable    Health maintenance  -Colonoscopy - flex sig 4/19/2023, no further screening  -Mammogram 3/22/2024, continue biannual  -Recommend annual influenza, recommend RSV, COVID booster, Tdap booster  -Continue diet and exercise lifestyle changes  -Patient has recent relapse, smoking 10cigs/week- contemplative stage, 3 min in discussion  -Last DEXA 3/25/2025, normal  Followup in 3-4 months or as needed  Problem List Items Addressed This Visit    None  5-10 minutes were spent on screening for Depression.  The 10-year ASCVD risk score (Kelly BERNAL, et al., 2019) is: 46.4%    Values used to calculate the score:      Age: 80 years      Sex: Female      Is Non- : Yes      Diabetic: Yes      Tobacco smoker: Yes      Systolic Blood Pressure: 145 mmHg      Is BP treated: Yes      HDL Cholesterol: 36.6 mg/dL      Total Cholesterol: 143 mg/dL  Cardiovascular risk discussed and modifiable risk factors addressed.  Discussion included options of pharmaceutical interventions and  recommended lifestyle modifications, including nutritional choices, exercise, and elimination of habits contributing to risk.   >15 minutes spent on assessment and discussion.  I discussed smoking history/status to determine this patient meets criteria for lung cancer screening with low-dose CT scan; using shared decision making we determined the patient will benefit from a screening, including a discussion of benefits and harms of screening, follow-up diagnostic testing, overdiagnosis, false positive rates, and total radiation exposure; I counseled the patient on the importance of adhering to annual lung cancer low-dose CT screening, the impact of comorbidities, and his or her ability or willingness to undergo diagnosis and treatment if abnormalities are discovered; I provided patient order for low-dose CT lung cancer screening             Tae Mancia MD

## 2025-05-08 DIAGNOSIS — E11.9 TYPE 2 DIABETES MELLITUS WITHOUT COMPLICATIONS: ICD-10-CM

## 2025-05-09 RX ORDER — INSULIN HUMAN 100 [IU]/ML
INJECTION, SUSPENSION SUBCUTANEOUS
Qty: 30 ML | Refills: 3 | Status: SHIPPED | OUTPATIENT
Start: 2025-05-09 | End: 2026-05-09

## 2025-07-04 DIAGNOSIS — N18.32 CKD STAGE G3B/A2, GFR 30-44 AND ALBUMIN CREATININE RATIO 30-299 MG/G (MULTI): ICD-10-CM

## 2025-07-04 DIAGNOSIS — Z12.31 ENCOUNTER FOR SCREENING MAMMOGRAM FOR BREAST CANCER: ICD-10-CM

## 2025-07-04 DIAGNOSIS — F17.200 CURRENT SMOKER: ICD-10-CM

## 2025-07-04 DIAGNOSIS — Z00.00 ROUTINE GENERAL MEDICAL EXAMINATION AT HEALTH CARE FACILITY: ICD-10-CM

## 2025-07-04 DIAGNOSIS — E11.9 TYPE 2 DIABETES MELLITUS WITHOUT COMPLICATIONS: ICD-10-CM

## 2025-07-04 DIAGNOSIS — I15.0 RENOVASCULAR HYPERTENSION: ICD-10-CM

## 2025-07-08 DIAGNOSIS — N18.32 CKD STAGE G3B/A2, GFR 30-44 AND ALBUMIN CREATININE RATIO 30-299 MG/G (MULTI): ICD-10-CM

## 2025-07-08 DIAGNOSIS — Z12.31 ENCOUNTER FOR SCREENING MAMMOGRAM FOR BREAST CANCER: ICD-10-CM

## 2025-07-08 DIAGNOSIS — I15.0 RENOVASCULAR HYPERTENSION: ICD-10-CM

## 2025-07-08 DIAGNOSIS — Z00.00 ROUTINE GENERAL MEDICAL EXAMINATION AT HEALTH CARE FACILITY: ICD-10-CM

## 2025-07-08 DIAGNOSIS — E11.9 TYPE 2 DIABETES MELLITUS WITHOUT COMPLICATIONS: ICD-10-CM

## 2025-07-08 DIAGNOSIS — F17.200 CURRENT SMOKER: ICD-10-CM

## 2025-07-08 RX ORDER — INSULIN HUMAN 100 [IU]/ML
INJECTION, SOLUTION PARENTERAL
Qty: 10 ML | Refills: 3 | Status: SHIPPED | OUTPATIENT
Start: 2025-07-08 | End: 2025-07-11

## 2025-07-11 ENCOUNTER — APPOINTMENT (OUTPATIENT)
Dept: PRIMARY CARE | Facility: CLINIC | Age: 80
End: 2025-07-11
Payer: MEDICARE

## 2025-07-11 RX ORDER — INSULIN HUMAN 100 [IU]/ML
INJECTION, SOLUTION PARENTERAL
Qty: 10 ML | Refills: 3 | Status: SHIPPED | OUTPATIENT
Start: 2025-07-11

## 2025-08-05 ENCOUNTER — APPOINTMENT (OUTPATIENT)
Dept: PRIMARY CARE | Facility: CLINIC | Age: 80
End: 2025-08-05
Payer: MEDICARE

## 2025-08-05 VITALS
TEMPERATURE: 97.5 F | WEIGHT: 169.5 LBS | OXYGEN SATURATION: 91 % | BODY MASS INDEX: 30.03 KG/M2 | SYSTOLIC BLOOD PRESSURE: 142 MMHG | DIASTOLIC BLOOD PRESSURE: 74 MMHG | HEART RATE: 68 BPM

## 2025-08-05 DIAGNOSIS — Z79.4 TYPE 2 DIABETES MELLITUS WITHOUT COMPLICATION, WITH LONG-TERM CURRENT USE OF INSULIN: Primary | ICD-10-CM

## 2025-08-05 DIAGNOSIS — M31.6 GCA (GIANT CELL ARTERITIS): ICD-10-CM

## 2025-08-05 DIAGNOSIS — N18.32 CKD STAGE G3B/A2, GFR 30-44 AND ALBUMIN CREATININE RATIO 30-299 MG/G (MULTI): ICD-10-CM

## 2025-08-05 DIAGNOSIS — E11.9 TYPE 2 DIABETES MELLITUS WITHOUT COMPLICATION, WITH LONG-TERM CURRENT USE OF INSULIN: Primary | ICD-10-CM

## 2025-08-05 DIAGNOSIS — I10 PRIMARY HYPERTENSION: ICD-10-CM

## 2025-08-05 PROCEDURE — 99214 OFFICE O/P EST MOD 30 MIN: CPT | Performed by: INTERNAL MEDICINE

## 2025-08-05 PROCEDURE — G2211 COMPLEX E/M VISIT ADD ON: HCPCS | Performed by: INTERNAL MEDICINE

## 2025-08-05 PROCEDURE — 1159F MED LIST DOCD IN RCRD: CPT | Performed by: INTERNAL MEDICINE

## 2025-08-05 PROCEDURE — 3077F SYST BP >= 140 MM HG: CPT | Performed by: INTERNAL MEDICINE

## 2025-08-05 PROCEDURE — 3078F DIAST BP <80 MM HG: CPT | Performed by: INTERNAL MEDICINE

## 2025-08-05 NOTE — PROGRESS NOTES
Subjective   Patient ID: Maureen Ahuja is a 80 y.o. female who presents for Follow-up.    PMH significant for CKD, IDDM, HTN and GCA (see visit 1/17/2025)    Interim:  - Rheum, Dr. Thomas, [facility], 4/16/2025; symptoms improving, continue on prednisone, Bactrim, ordered US carotids and referred to rheum in [place]  - Dr. Hankins, optho, 5/7/2025  - telephone encounter in May with decrease to 9mg prednisone daily and now 8mg  - she has nephro appointment tomorrow and Rheum on 8/26     Vision is getting better but still not back to driving.  Needs labs monthly per rheumatology, continues on steroid taper, currently on 15 mg prednisone daily for 2 weeks after which she will go down to 12.5 mg.     She is very happy with the insulin regimen she is currently on.  When she got the hang of giving herself sliding scale insulin she has seen that her blood sugars are very well-controlled.  Currently taking Humulin N 20 units before breakfast and 10 units before supper.  Using corrective scale her preprandial insulin ranges from 4-8 units on average.  Reports that her blood sugar recent mornings is 100-150.  Lowest glucose reading around 100 and highest around 300.     She is having persistent swelling in the lower extremities which is bothering her, says she still makes quite a lot of urine and takes furosemide twice daily.  Reports that her legs improve with elevation and compression but not doing either regularly.         Brother had a stroke and lives with her, caretakers come M-F.  He is wheelchair bound and almost completely dependent.      She denies any numbness or pain in the feet, no chest pain shortness of breath or dizziness.  In general has been trying to watch her diet.     She has been eating better, more vegetables. She loves salads, cooks herself.   Had quit smoking as of July 8, 2016 (~1/2ppd for 40 years at that time) except for occasional brief lapses, but back to 7-10 cigs per week because of recent stress of her  brother's stroke, since Dec 2020. She is trying to cut back. She knows that she wants to quit but is not ready at this time.  Not interested in assistance, greater than 3 minutes spent in discussion.     Nephrology-Dr. Jane --> Shikha  Rheumatology-Dr. Irving Ibarra  Endocrinology-Dr. Hodge (retired) - now will be this office  Ophthalmology-Dr. Tony, Dr. Goldberg     Objective   Physical Exam    /74   Pulse 68   Temp 36.4 °C (97.5 °F)   Wt 76.9 kg (169 lb 8 oz)   SpO2 91%   BMI 30.03 kg/m²      Gen: NAD, pleasant, A&Ox3  HEENT: PERRL, EOMI, MMM, OP clear  Neck: supple, no JVD, normal carotid upstroke  Pulm: lungs rhonchorous, good air movement, no wheezes or crackles  CV: RRR, no m/r/g, 2+ DP pulses  Abd: NABS, soft, NT, ND no HSM  Ext: 2+ peripheral edema to ankles, feet and lower extremities without wounds  Neuro: CN II-XII intact, no focal motor deficits; decreased sensation to vibration in b/l feet       Assessment/Plan     HTN/CKD G3b w/proteinuria: nephrologist, Dr. Kelvin Jane--> Trivedi  -Continue amlodipine 10 mg, carvedilol 12.5 mg twice daily, doxazosin 2mg QHS, furosemide 20mg BID   -Continue Farxiga, indicated for diabetes and CKD with albuminuria  -Continue valsartan 160 mg daily for now, in the future considering titrating up her ARB and potentially decreasing her amlodipine if needed to optimize RAAS inhibition as well as decrease edema  - compression and elevation reiterated     GCA:  -Currently on prednisone taper  -Has follow-up with rheumatology and ophthalmology     Diabetes adult onset insulin-dependent: 6.6% A1c at last visit, discussed importance of avoiding hypoglycemia;   -Continue lower basal insulin with corrective preprandial insulin given expectation of fluctuating blood sugars while on steroids,, discussed monitoring closely for lower blood sugars as she tapers off the prednisone  -Continue Humulin N 20 units before breakfast and 10 units before supper  -Continue Humulin R  4 units before each meal plus corrective scale with a maximum dose of 10 units total given her history and concerns about hypoglycemia  - declines CGM  -Dr. Hodge (endo) retired - managed here per her preference  -regular podiatry, Dr. Inman  -Annual ophthalmology appointments, Dr. Goldberg, overdue  -Continue aspirin for primary prevention, pt prefers to continue after discussion of risk/benefit  -Continue pravastatin for primary prevention and dyslipidemia  -Continue diet regulation  -Off metformin since VERONIQUE, started SGLT2-i in JUN 2022     CV:   -Atherosclerosis thoracic aorta and coronaries seen on imaging, would target lower LDL  -Fusiform dilation of proximal descending thoracic aorta stable 3/24/2025     History of Vitamin D and B12 deficiency: Continue supplementation, last levels were normal (3/6/2024)        Anemia: Stable borderline macrocytic, likely secondary to B12 deficiency  -Continue to B12 supplementation     Hip/back pain: tolerating, stiff when standing up but loosens up     Emphysema: mild, CT 3/3/2023, asymptomatic; this was done at [facility], LDCT done 3/24/2025, showing mild emphysema and chronic bronchitis, benign appearing nodules, relatively stable     Health maintenance  -Colonoscopy - flex sig 4/19/2023, no further screening  -Mammogram 3/22/2024, continue biannual  -Recommend annual influenza, recommend RSV, COVID booster, Tdap booster  -Continue diet and exercise lifestyle changes  -Patient has recent relapse, smoking 10cigs/week- contemplative stage, 3 min in discussion  -Last DEXA 3/25/2025, normal  Followup in 3-4 months or as needed    Tae Mancia MD

## 2025-11-07 ENCOUNTER — APPOINTMENT (OUTPATIENT)
Dept: PRIMARY CARE | Facility: CLINIC | Age: 80
End: 2025-11-07
Payer: MEDICARE